# Patient Record
Sex: MALE | Race: WHITE | NOT HISPANIC OR LATINO | Employment: OTHER | URBAN - METROPOLITAN AREA
[De-identification: names, ages, dates, MRNs, and addresses within clinical notes are randomized per-mention and may not be internally consistent; named-entity substitution may affect disease eponyms.]

---

## 2018-01-04 ENCOUNTER — HOSPITAL ENCOUNTER (OUTPATIENT)
Dept: OTHER | Facility: HOSPITAL | Age: 83
Discharge: HOME | End: 2018-01-04
Attending: INTERNAL MEDICINE | Admitting: INTERNAL MEDICINE

## 2018-04-09 ENCOUNTER — HOSPITAL ENCOUNTER (OUTPATIENT)
Dept: GENERAL RADIOLOGY | Facility: HOSPITAL | Age: 83
Discharge: HOME | End: 2018-04-09
Attending: FAMILY MEDICINE | Admitting: FAMILY MEDICINE

## 2018-10-08 ENCOUNTER — HOSPITAL ENCOUNTER (OUTPATIENT)
Dept: OTHER | Facility: HOSPITAL | Age: 83
Discharge: HOME | End: 2018-10-08
Attending: FAMILY MEDICINE | Admitting: FAMILY MEDICINE

## 2018-11-28 ENCOUNTER — HOSPITAL ENCOUNTER (OUTPATIENT)
Dept: OTHER | Facility: HOSPITAL | Age: 83
Discharge: HOME | End: 2018-11-28
Attending: FAMILY MEDICINE | Admitting: FAMILY MEDICINE

## 2018-11-30 ENCOUNTER — HOSPITAL ENCOUNTER (OUTPATIENT)
Dept: OTHER | Facility: HOSPITAL | Age: 83
Discharge: HOME | End: 2018-11-30
Attending: FAMILY MEDICINE | Admitting: FAMILY MEDICINE

## 2019-07-31 ENCOUNTER — OFFICE VISIT (OUTPATIENT)
Dept: CARDIOLOGY | Facility: CLINIC | Age: 84
End: 2019-07-31

## 2019-07-31 VITALS
BODY MASS INDEX: 24.16 KG/M2 | DIASTOLIC BLOOD PRESSURE: 70 MMHG | SYSTOLIC BLOOD PRESSURE: 116 MMHG | WEIGHT: 145 LBS | HEART RATE: 79 BPM | OXYGEN SATURATION: 99 % | RESPIRATION RATE: 20 BRPM | HEIGHT: 65 IN

## 2019-07-31 DIAGNOSIS — F03.90 DEMENTIA WITHOUT BEHAVIORAL DISTURBANCE, UNSPECIFIED DEMENTIA TYPE: ICD-10-CM

## 2019-07-31 DIAGNOSIS — E78.2 MIXED HYPERLIPIDEMIA: ICD-10-CM

## 2019-07-31 DIAGNOSIS — N18.30 STAGE 3 CHRONIC KIDNEY DISEASE (HCC): ICD-10-CM

## 2019-07-31 DIAGNOSIS — I10 ESSENTIAL HYPERTENSION: ICD-10-CM

## 2019-07-31 DIAGNOSIS — G47.33 OSA (OBSTRUCTIVE SLEEP APNEA): ICD-10-CM

## 2019-07-31 DIAGNOSIS — I25.10 CORONARY ARTERY DISEASE DUE TO LIPID RICH PLAQUE: Primary | ICD-10-CM

## 2019-07-31 DIAGNOSIS — I25.83 CORONARY ARTERY DISEASE DUE TO LIPID RICH PLAQUE: Primary | ICD-10-CM

## 2019-07-31 PROCEDURE — 99204 OFFICE O/P NEW MOD 45 MIN: CPT | Performed by: INTERNAL MEDICINE

## 2019-07-31 RX ORDER — TAMSULOSIN HYDROCHLORIDE 0.4 MG/1
1 CAPSULE ORAL NIGHTLY
COMMUNITY

## 2019-07-31 RX ORDER — CHLORAL HYDRATE 500 MG
1 CAPSULE ORAL DAILY
COMMUNITY

## 2019-07-31 RX ORDER — ASPIRIN 81 MG/1
81 TABLET ORAL DAILY
COMMUNITY

## 2019-07-31 RX ORDER — ROPINIROLE 0.5 MG/1
0.5 TABLET, FILM COATED ORAL NIGHTLY
COMMUNITY

## 2019-08-01 PROBLEM — F03.90 DEMENTIA (HCC): Status: ACTIVE | Noted: 2019-08-01

## 2019-08-01 NOTE — PROGRESS NOTES
Subjective:     Encounter Date:07/31/2019      Patient ID: Matthew Rosenbaum is a 89 y.o. male.    Chief Complaint:  Chief Complaint   Patient presents with   • Hypertension   • Hyperlipidemia   • Coronary Artery Disease       HPI:  Matthew is a very pleasant 89-year-old patient with known coronary artery disease status post coronary artery bypass grafting in 1990 and then repeat coronary artery bypass grafting in 2014.  In 2014 he underwent cardiac catheterization was found to have an 80% lesion in his LAD and a 70% lesion in his circumflex artery with a totally occluded right coronary artery.    At the time he had a severely depressed LV systolic function and underwent ICD implantation.  1/4/2018 which showed an ejection fraction of 35 to 40%, mild to moderate global left ventricular hypokinesis with mild mitral regurgitation and sclerotic changes of the aortic valve.    He also has underlying dyslipidemia hypertension, obstructive sleep apnea and CKD class III.    Today he has no complaints from a cardiovascular standpoint.    The following portions of the patient's history were reviewed and updated as appropriate: allergies, current medications, past family history, past medical history, past social history, past surgical history and problem list.    Problem List:  Patient Active Problem List   Diagnosis   • CHICO (obstructive sleep apnea)   • Coronary artery disease due to lipid rich plaque   • Mixed hyperlipidemia   • Essential hypertension   • Stage 3 chronic kidney disease (CMS/HCC)   • Dementia       Past Medical History:  Past Medical History:   Diagnosis Date   • CAD (coronary artery disease)    • Chronic kidney disease    • Dementia 8/1/2019   • History of echocardiogram 01/04/2018    EF 35-40%, Mild-Mod golbal hypokinesis, Mild MR/TR, RVSP 38 mmHg, AV sclerotic and Mild calcification   • Hyperlipidemia    • Hypertension    • CHICO (obstructive sleep apnea)        Past Surgical History:  Past Surgical  "History:   Procedure Laterality Date   • CARDIAC CATHETERIZATION  05/2014    EF 50%, Mid LAD 80% stenosis, Circ 70% stenosis, % occluded   • CARDIAC DEFIBRILLATOR PLACEMENT  07/2014    [employer]   • CORONARY ARTERY BYPASS GRAFT         Social History:  Social History     Socioeconomic History   • Marital status:      Spouse name: Not on file   • Number of children: Not on file   • Years of education: Not on file   • Highest education level: Not on file   Tobacco Use   • Smoking status: Former Smoker   • Smokeless tobacco: Never Used   Substance and Sexual Activity   • Alcohol use: No     Frequency: Never   • Drug use: No       Allergies:  Allergies   Allergen Reactions   • Penicillins Hives         Review of Symptoms:  Constitutional: Patient afebrile no chills or unexpected weight changes  Respiratory: No cough, no wheezing or dyspnea  Cardiovascular: No chest pain, palpitations, dyspnea, orthopnea and no edema  Gastrointestinal: No nausea, vomiting, constipation or diarrhea.  No melena or dark stools    All other systems reviewed and are negative           Objective:         /70 (BP Location: Left arm, Patient Position: Sitting, Cuff Size: Large Adult)   Pulse 79   Resp 20   Ht 165.1 cm (65\")   Wt 65.8 kg (145 lb)   SpO2 99%   BMI 24.13 kg/m²     Physical exam  Constitutional: well-nourished, and appears stated age in no acute distress  PERRL: Conjunctiva clear, no pallor, anicteric  HENMT: normocephalic, normal dentition, no cyanosis or pallor  Neck:no bruits, or thrills and bilateral normal carotid upstroke. Normal jugular venous pressure  Cardiovascular: No parasternal heaves an non-displaced focal PMI. Normal rate and rhythm: no rub, +S3, murmur or click and normal S1 and S2; no lower or upper extremity edema.   Lungs: unlabored, no wheezing with no rales or rhonchi on auscultation.  Extremities: Warm, no clubbing, cyanosis, or edema. Full and equal peripheral pulses in extremities " with no bruits appreciated.   Abdomen: soft, non-tender, non-distended  Musculoskeletal: no joint tenderness or swelling and no erythema  Skin: Warm and dry, non-erythematous   Neuro:alert and normal affect. Oriented to time, place and person.       In-Office Procedure(s):  Procedures    ASCVD RIsk Score::  The ASCVD Risk score (Khari CORDOBA Jr., et al., 2013) failed to calculate for the following reasons:    The 2013 ASCVD risk score is only valid for ages 40 to 79    Recent Radiology:  Imaging Results (most recent)     None          Lab Review:   No visits with results within 2 Month(s) from this visit.   Latest known visit with results is:   Office Visit Converted on 04/08/2019   Component Date Value   • Albumin 10/08/2018 4.3    • Alkaline Phosphatase 10/08/2018 112    • Basophils Absolute 10/08/2018 51 CELLS/UL    • Basophil Rel % 10/08/2018 0.4    • Total Bilirubin 10/08/2018 0.7    • BUN 10/08/2018 32*   • BUN/Creatinine Ratio 10/08/2018 21 (calc)    • Calcium 10/08/2018 9.2    • Chloride 10/08/2018 102    • Chol/HDL Ratio 10/08/2018 3.0 (calc)    • Total Cholesterol 10/08/2018 134    • CO2 CONTENT  10/08/2018 25    • Creatinine 10/08/2018 1.49*   • eGFR  Am 10/08/2018 41*   • eGFR Non  Am 10/08/2018 48*   • Eosinophils Absolute 10/08/2018 356 CELLS/UL    • Eosinophil Rel % 10/08/2018 2.8    • Globulin 10/08/2018 3.7 G/DL (CALC)    • Glucose 10/08/2018 93    • Hematocrit 10/08/2018 39.2    • HDL Cholesterol 10/08/2018 45    • Hemoglobin 10/08/2018 13.2    • LDL Cholesterol  10/08/2018 69 MG/DL (CALC)    • Lymphocytes Absolute 10/08/2018 2502 CELLS/UL    • Lymphocyte Rel % 10/08/2018 19.7    • MCH 10/08/2018 31.1    • MCHC 10/08/2018 33.7 G/DL    • MCV 10/08/2018 92.2    • Monocyte Rel % 10/08/2018 8.3    • Monocytes Absolute 10/08/2018 1054 CELLS/UL*   • MPV 10/08/2018 9.9    • Neutrophils Absolute 10/08/2018 8738 CELLS/UL*   • Platelets 10/08/2018 288 THOUSAND/UL    • Neutrophils, Fluid 10/08/2018  68.8    • Potassium 10/08/2018 4.0    • Total Protein 10/08/2018 8.0    • RBC 10/08/2018 4.25 MILLION/UL    • RDW 10/08/2018 11.9    • AST (SGOT) 10/08/2018 16    • ALT (SGPT) 10/08/2018 8*   • Sodium 10/08/2018 140    • Triglycerides 10/08/2018 123    • WBC 10/08/2018 12.7 THOUSAND/UL*   • A/G Ratio 10/08/2018 1.2 (calc)    • Albumin 04/08/2019 4.7    • Alkaline Phosphatase 04/08/2019 99    • Basophils Absolute 04/08/2019 62 CELLS/UL    • Basophil Rel % 04/08/2019 0.6    • Total Bilirubin 04/08/2019 0.5    • BUN 04/08/2019 31*   • BUN/Creatinine Ratio 04/08/2019 19 (calc)    • Calcium 04/08/2019 9.2    • Chloride 04/08/2019 99    • Chol/HDL Ratio 04/08/2019 3.4 (calc)    • Total Cholesterol 04/08/2019 149    • CO2 CONTENT  04/08/2019 30    • Creatinine 04/08/2019 1.63*   • eGFR  Am 04/08/2019 37*   • eGFR Non African Am 04/08/2019 43*   • Eosinophils Absolute 04/08/2019 515 CELLS/UL*   • Eosinophil Rel % 04/08/2019 5.0    • Globulin 04/08/2019 3.4 G/DL (CALC)    • Glucose 04/08/2019 85    • Hematocrit 04/08/2019 39.8    • HDL Cholesterol 04/08/2019 44    • Hemoglobin 04/08/2019 13.6    • LDL Cholesterol  04/08/2019 79 MG/DL (CALC)    • Lymphocytes Absolute 04/08/2019 2379 CELLS/UL    • Lymphocyte Rel % 04/08/2019 23.1    • MCH 04/08/2019 31.6    • MCHC 04/08/2019 34.2 G/DL    • MCV 04/08/2019 92.3    • Monocyte Rel % 04/08/2019 7.4    • Monocytes Absolute 04/08/2019 762 CELLS/UL    • MPV 04/08/2019 10.4    • Neutrophils Absolute 04/08/2019 6582 CELLS/UL    • Platelets 04/08/2019 234 THOUSAND/UL    • Neutrophils, Fluid 04/08/2019 63.9    • Potassium 04/08/2019 3.9    • Total Protein 04/08/2019 8.1    • RBC 04/08/2019 4.31 MILLION/UL    • RDW 04/08/2019 12.3    • AST (SGOT) 04/08/2019 19    • ALT (SGPT) 04/08/2019 8*   • Sodium 04/08/2019 140    • Triglycerides 04/08/2019 164*   • WBC 04/08/2019 10.3 THOUSAND/UL    • A/G Ratio 04/08/2019 1.4 (calc)               Invalid input(s): ALKPO4                         Invalid input(s): LDLCALC                Assessment:          Diagnosis Plan   1. Coronary artery disease due to lipid rich plaque     2. Mixed hyperlipidemia     3. Essential hypertension     4. CHICO (obstructive sleep apnea)     5. Stage 3 chronic kidney disease (CMS/HCC)     6. Dementia without behavioral disturbance, unspecified dementia type            Plan:         1. Coronary artery disease due to lipid rich plaque  Currently clinically silent with ischemic cardiomyopathy status post implantable cardiac defibrillator.  New medical therapy as secondary prevention for the development of ischemic heart disease.    2. Mixed hyperlipidemia  Well-controlled on medical therapy    3. Essential hypertension  Well-controlled on medical therapy    4. CHICO (obstructive sleep apnea)  Using a CPAP    5. Stage 3 chronic kidney disease (CMS/HCC)  Stable    6. Dementia without behavioral disturbance, unspecified dementia type  Mild      Level of Care:                 Rajan Golden MD  08/01/19  .

## 2019-10-04 RX ORDER — PRAVASTATIN SODIUM 40 MG
TABLET ORAL
Qty: 90 TABLET | Refills: 1 | Status: SHIPPED | OUTPATIENT
Start: 2019-10-04 | End: 2020-03-11

## 2019-10-09 RX ORDER — POTASSIUM CHLORIDE 750 MG/1
TABLET, EXTENDED RELEASE ORAL
Qty: 90 TABLET | Refills: 1 | Status: SHIPPED | OUTPATIENT
Start: 2019-10-09 | End: 2020-03-11

## 2019-12-10 ENCOUNTER — TELEPHONE (OUTPATIENT)
Dept: CARDIOLOGY | Facility: CLINIC | Age: 84
End: 2019-12-10

## 2019-12-23 ENCOUNTER — OFFICE VISIT (OUTPATIENT)
Dept: CARDIOLOGY | Facility: CLINIC | Age: 84
End: 2019-12-23

## 2019-12-23 VITALS
BODY MASS INDEX: 23.37 KG/M2 | SYSTOLIC BLOOD PRESSURE: 129 MMHG | HEART RATE: 66 BPM | DIASTOLIC BLOOD PRESSURE: 64 MMHG | HEIGHT: 68 IN | WEIGHT: 154.2 LBS

## 2019-12-23 DIAGNOSIS — I10 ESSENTIAL HYPERTENSION: ICD-10-CM

## 2019-12-23 DIAGNOSIS — I25.83 CORONARY ARTERY DISEASE DUE TO LIPID RICH PLAQUE: Primary | ICD-10-CM

## 2019-12-23 DIAGNOSIS — I25.10 CORONARY ARTERY DISEASE DUE TO LIPID RICH PLAQUE: Primary | ICD-10-CM

## 2019-12-23 DIAGNOSIS — F03.90 DEMENTIA WITHOUT BEHAVIORAL DISTURBANCE, UNSPECIFIED DEMENTIA TYPE: ICD-10-CM

## 2019-12-23 DIAGNOSIS — N18.30 STAGE 3 CHRONIC KIDNEY DISEASE (HCC): ICD-10-CM

## 2019-12-23 DIAGNOSIS — Z95.810 ICD (IMPLANTABLE CARDIOVERTER-DEFIBRILLATOR), DUAL, IN SITU: ICD-10-CM

## 2019-12-23 PROCEDURE — 93289 INTERROG DEVICE EVAL HEART: CPT | Performed by: INTERNAL MEDICINE

## 2019-12-23 PROCEDURE — 99024 POSTOP FOLLOW-UP VISIT: CPT | Performed by: INTERNAL MEDICINE

## 2019-12-23 NOTE — PROGRESS NOTES
Subjective:     Encounter Date:12/23/2019      Patient ID: Matthew Rosenbaum is a 89 y.o. male.    Chief Complaint:  Presents for ICD followup    HPI:  Mr Rosenbaum is an 89 yo patient of Dr. Golden who presents for routine ICD followup.  He has a past medical history significant for HTN, HLD, CKD-3, CAD and ischemic cardiomyopathy.  He also has permanent AF.  He had an ICD implanted in 7/14.    Due to his dementia, he is not able to give any meaningful history of review of systems.      The following portions of the patient's history were reviewed and updated as appropriate: allergies, current medications, past family history, past medical history, past social history, past surgical history and problem list.    Problem List:  Patient Active Problem List   Diagnosis   • CHICO (obstructive sleep apnea)   • Coronary artery disease due to lipid rich plaque   • Mixed hyperlipidemia   • Essential hypertension   • Stage 3 chronic kidney disease (CMS/HCC)   • Dementia (CMS/[facility])   • ICD (implantable cardioverter-defibrillator), dual, in situ       Past Medical History:  Past Medical History:   Diagnosis Date   • CAD (coronary artery disease)    • Chronic kidney disease    • Dementia (CMS/[facility]) 8/1/2019   • History of echocardiogram 01/04/2018    EF 35-40%, Mild-Mod golbal hypokinesis, Mild MR/TR, RVSP 38 mmHg, AV sclerotic and Mild calcification   • Hyperlipidemia    • Hypertension    • CHICO (obstructive sleep apnea)        Past Surgical History:  Past Surgical History:   Procedure Laterality Date   • CARDIAC CATHETERIZATION  05/2014    EF 50%, Mid LAD 80% stenosis, Circ 70% stenosis, % occluded   • CARDIAC DEFIBRILLATOR PLACEMENT  07/2014    [employer]   • CORONARY ARTERY BYPASS GRAFT         Social History:  Social History     Socioeconomic History   • Marital status:      Spouse name: Not on file   • Number of children: Not on file   • Years of education: Not on file   • Highest education level: Not on file  "  Tobacco Use   • Smoking status: Former Smoker   • Smokeless tobacco: Never Used   Substance and Sexual Activity   • Alcohol use: No     Frequency: Never   • Drug use: No       Allergies:  Allergies   Allergen Reactions   • Penicillins Hives       Immunizations:    There is no immunization history on file for this patient.    ROS:  Review of Systems   Unable to perform ROS: dementia          Objective:         /64   Pulse 66   Ht 172.7 cm (68\")   Wt 69.9 kg (154 lb 3.2 oz)   BMI 23.45 kg/m²     Physical Exam   Constitutional: He is oriented to person, place, and time. He appears well-developed and well-nourished. No distress.   HENT:   Head: Normocephalic and atraumatic.   Eyes: Pupils are equal, round, and reactive to light. Conjunctivae and EOM are normal. No scleral icterus.   Neck: Normal range of motion. No thyromegaly present.   Cardiovascular: Normal rate, regular rhythm and normal heart sounds.   Pulmonary/Chest: Effort normal and breath sounds normal.   Abdominal: Soft. Bowel sounds are normal.   Musculoskeletal: Normal range of motion.   Neurological: He is alert and oriented to person, place, and time.   Skin: Skin is warm and dry.   Psychiatric: He has a normal mood and affect.       In-Office Procedure(s):  Procedures ICD eval interpreted by me  MDT [id number]  Battery 3.04V    R wave 1.3mv  Threshold 2.5V  Impedancd 523 ohms  HV 47/70 ohms    Events - 24 NSVT, no shocks    ASCVD RIsk Score::  The ASCVD Risk score ([place] Jr., et al., 2013) failed to calculate for the following reasons:    The 2013 ASCVD risk score is only valid for ages 40 to 79    Recent Radiology:  Imaging Results (Most Recent)     None          Lab Review:   No visits with results within 6 Month(s) from this visit.   Latest known visit with results is:   Office Visit Converted on 04/08/2019   Component Date Value   • Albumin 10/08/2018 4.3    • Alkaline Phosphatase 10/08/2018 112    • Basophils Absolute 10/08/2018 51 " CELLS/UL    • Basophil Rel % 10/08/2018 0.4    • Total Bilirubin 10/08/2018 0.7    • BUN 10/08/2018 32*   • BUN/Creatinine Ratio 10/08/2018 21 (calc)    • Calcium 10/08/2018 9.2    • Chloride 10/08/2018 102    • Chol/HDL Ratio 10/08/2018 3.0 (calc)    • Total Cholesterol 10/08/2018 134    • CO2 CONTENT  10/08/2018 25    • Creatinine 10/08/2018 1.49*   • eGFR  Am 10/08/2018 41*   • eGFR Non  Am 10/08/2018 48*   • Eosinophils Absolute 10/08/2018 356 CELLS/UL    • Eosinophil Rel % 10/08/2018 2.8    • Globulin 10/08/2018 3.7 G/DL (CALC)    • Glucose 10/08/2018 93    • Hematocrit 10/08/2018 39.2    • HDL Cholesterol 10/08/2018 45    • Hemoglobin 10/08/2018 13.2    • LDL Cholesterol  10/08/2018 69 MG/DL (CALC)    • Lymphocytes Absolute 10/08/2018 2502 CELLS/UL    • Lymphocyte Rel % 10/08/2018 19.7    • MCH 10/08/2018 31.1    • MCHC 10/08/2018 33.7 G/DL    • MCV 10/08/2018 92.2    • Monocyte Rel % 10/08/2018 8.3    • Monocytes Absolute 10/08/2018 1054 CELLS/UL*   • MPV 10/08/2018 9.9    • Neutrophils Absolute 10/08/2018 8738 CELLS/UL*   • Platelets 10/08/2018 288 THOUSAND/UL    • Neutrophils, Fluid 10/08/2018 68.8    • Potassium 10/08/2018 4.0    • Total Protein 10/08/2018 8.0    • RBC 10/08/2018 4.25 MILLION/UL    • RDW 10/08/2018 11.9    • AST (SGOT) 10/08/2018 16    • ALT (SGPT) 10/08/2018 8*   • Sodium 10/08/2018 140    • Triglycerides 10/08/2018 123    • WBC 10/08/2018 12.7 THOUSAND/UL*   • A/G Ratio 10/08/2018 1.2 (calc)    • Albumin 04/08/2019 4.7    • Alkaline Phosphatase 04/08/2019 99    • Basophils Absolute 04/08/2019 62 CELLS/UL    • Basophil Rel % 04/08/2019 0.6    • Total Bilirubin 04/08/2019 0.5    • BUN 04/08/2019 31*   • BUN/Creatinine Ratio 04/08/2019 19 (calc)    • Calcium 04/08/2019 9.2    • Chloride 04/08/2019 99    • Chol/HDL Ratio 04/08/2019 3.4 (calc)    • Total Cholesterol 04/08/2019 149    • CO2 CONTENT  04/08/2019 30    • Creatinine 04/08/2019 1.63*   • eGFR  Am 04/08/2019 37*    • eGFR Non African Am 04/08/2019 43*   • Eosinophils Absolute 04/08/2019 515 CELLS/UL*   • Eosinophil Rel % 04/08/2019 5.0    • Globulin 04/08/2019 3.4 G/DL (CALC)    • Glucose 04/08/2019 85    • Hematocrit 04/08/2019 39.8    • HDL Cholesterol 04/08/2019 44    • Hemoglobin 04/08/2019 13.6    • LDL Cholesterol  04/08/2019 79 MG/DL (CALC)    • Lymphocytes Absolute 04/08/2019 2379 CELLS/UL    • Lymphocyte Rel % 04/08/2019 23.1    • MCH 04/08/2019 31.6    • MCHC 04/08/2019 34.2 G/DL    • MCV 04/08/2019 92.3    • Monocyte Rel % 04/08/2019 7.4    • Monocytes Absolute 04/08/2019 762 CELLS/UL    • MPV 04/08/2019 10.4    • Neutrophils Absolute 04/08/2019 6582 CELLS/UL    • Platelets 04/08/2019 234 THOUSAND/UL    • Neutrophils, Fluid 04/08/2019 63.9    • Potassium 04/08/2019 3.9    • Total Protein 04/08/2019 8.1    • RBC 04/08/2019 4.31 MILLION/UL    • RDW 04/08/2019 12.3    • AST (SGOT) 04/08/2019 19    • ALT (SGPT) 04/08/2019 8*   • Sodium 04/08/2019 140    • Triglycerides 04/08/2019 164*   • WBC 04/08/2019 10.3 THOUSAND/UL    • A/G Ratio 04/08/2019 1.4 (calc)                 Assessment:          Diagnosis Plan   1. Coronary artery disease due to lipid rich plaque     2. Essential hypertension     3. ICD (implantable cardioverter-defibrillator), dual, in situ     4. Stage 3 chronic kidney disease (CMS/HCC)     5. Dementia without behavioral disturbance, unspecified dementia type (CMS/HCC)            Plan:      1. ICD followup - normal device function  2. NSVT - no shocks delivered  3. CAD - per Dr. Golden    RTC 6 months      Level of Care:                 Héctor Domínguez MD  12/23/19  .

## 2019-12-27 PROBLEM — Z95.810 ICD (IMPLANTABLE CARDIOVERTER-DEFIBRILLATOR), DUAL, IN SITU: Status: ACTIVE | Noted: 2019-12-27

## 2020-01-10 ENCOUNTER — OFFICE VISIT (OUTPATIENT)
Dept: CARDIOLOGY | Facility: CLINIC | Age: 85
End: 2020-01-10

## 2020-01-10 VITALS
WEIGHT: 151 LBS | SYSTOLIC BLOOD PRESSURE: 118 MMHG | HEIGHT: 68 IN | DIASTOLIC BLOOD PRESSURE: 72 MMHG | BODY MASS INDEX: 22.88 KG/M2 | OXYGEN SATURATION: 95 % | RESPIRATION RATE: 20 BRPM | HEART RATE: 75 BPM

## 2020-01-10 DIAGNOSIS — Z95.810 ICD (IMPLANTABLE CARDIOVERTER-DEFIBRILLATOR), DUAL, IN SITU: ICD-10-CM

## 2020-01-10 DIAGNOSIS — G47.33 OSA (OBSTRUCTIVE SLEEP APNEA): ICD-10-CM

## 2020-01-10 DIAGNOSIS — I25.10 CORONARY ARTERY DISEASE DUE TO LIPID RICH PLAQUE: ICD-10-CM

## 2020-01-10 DIAGNOSIS — R07.89 CHEST PAIN, ATYPICAL: ICD-10-CM

## 2020-01-10 DIAGNOSIS — E78.2 MIXED HYPERLIPIDEMIA: ICD-10-CM

## 2020-01-10 DIAGNOSIS — I10 ESSENTIAL HYPERTENSION: ICD-10-CM

## 2020-01-10 DIAGNOSIS — R07.9 CHEST PAIN DUE TO GERD: Primary | ICD-10-CM

## 2020-01-10 DIAGNOSIS — I25.83 CORONARY ARTERY DISEASE DUE TO LIPID RICH PLAQUE: ICD-10-CM

## 2020-01-10 DIAGNOSIS — K21.9 CHEST PAIN DUE TO GERD: Primary | ICD-10-CM

## 2020-01-10 PROCEDURE — 99214 OFFICE O/P EST MOD 30 MIN: CPT | Performed by: INTERNAL MEDICINE

## 2020-01-10 RX ORDER — OMEPRAZOLE 40 MG/1
40 CAPSULE, DELAYED RELEASE ORAL 2 TIMES DAILY
Qty: 180 CAPSULE | Refills: 3 | Status: SHIPPED | OUTPATIENT
Start: 2020-01-10 | End: 2020-12-29

## 2020-01-11 PROBLEM — R07.89 CHEST PAIN, ATYPICAL: Status: ACTIVE | Noted: 2020-01-11

## 2020-01-11 NOTE — PROGRESS NOTES
Subjective:     Encounter Date:01/10/2020      Patient ID: Matthew Rosenbaum is a 89 y.o. male.    Chief Complaint:  Chief Complaint   Patient presents with   • Coronary Artery Disease   • Hypertension   • Hyperlipidemia   • Chest Pain       HPI:  Matthew is a very pleasant 89-year-old patient with known coronary artery disease status post coronary artery bypass grafting in 1990 and then repeat coronary artery bypass grafting in 2014.  In 2014 he underwent cardiac catheterization was found to have an 80% lesion in his LAD and a 70% lesion in his circumflex artery with a totally occluded right coronary artery.    At the time he had a severely depressed LV systolic function and underwent ICD implantation.  1/4/2018 which showed an ejection fraction of 35 to 40%, mild to moderate global left ventricular hypokinesis with mild mitral regurgitation and sclerotic changes of the aortic valve.    He also has underlying dyslipidemia hypertension, obstructive sleep apnea and CKD class III.    He recently saw Dr. Domínguez for ICD interrogation.  Please see Dr. Domínguez's note.  Today he presents with one episode of chest pain which is a new complaint to me occurred in the middle of the night and woke him from sleep.  He has had other recurrences of this but only while seated some occurring shakila-prandially.  He has no worsening with exertion.  He has no associated diaphoresis shortness of breath.  Self-limiting lasting no more than 1 to 3 minutes.    The following portions of the patient's history were reviewed and updated as appropriate: He  has a past medical history of CAD (coronary artery disease), Chest pain, atypical (1/11/2020), Chronic kidney disease, Dementia (CMS/HCC) (8/1/2019), History of echocardiogram (01/04/2018), Hyperlipidemia, Hypertension, and CHICO (obstructive sleep apnea)..    Problem List:  Patient Active Problem List   Diagnosis   • CHICO (obstructive sleep apnea)   • Coronary artery disease due to lipid rich  plaque   • Mixed hyperlipidemia   • Essential hypertension   • Stage 3 chronic kidney disease (CMS/[facility])   • Dementia (CMS/[facility])   • ICD (implantable cardioverter-defibrillator), dual, in situ   • Chest pain, atypical       Past Medical History:  Past Medical History:   Diagnosis Date   • CAD (coronary artery disease)    • Chest pain, atypical 1/11/2020   • Chronic kidney disease    • Dementia (CMS/[facility]) 8/1/2019   • History of echocardiogram 01/04/2018    EF 35-40%, Mild-Mod golbal hypokinesis, Mild MR/TR, RVSP 38 mmHg, AV sclerotic and Mild calcification   • Hyperlipidemia    • Hypertension    • CHICO (obstructive sleep apnea)        Past Surgical History:  Past Surgical History:   Procedure Laterality Date   • CARDIAC CATHETERIZATION  05/2014    EF 50%, Mid LAD 80% stenosis, Circ 70% stenosis, % occluded   • CARDIAC DEFIBRILLATOR PLACEMENT  07/2014    [employer]   • CORONARY ARTERY BYPASS GRAFT         Social History:  Social History     Socioeconomic History   • Marital status:      Spouse name: Not on file   • Number of children: Not on file   • Years of education: Not on file   • Highest education level: Not on file   Tobacco Use   • Smoking status: Former Smoker   • Smokeless tobacco: Never Used   Substance and Sexual Activity   • Alcohol use: No     Frequency: Never   • Drug use: No   • Sexual activity: Defer       Allergies:  Allergies   Allergen Reactions   • Morphine Hives   • Penicillins Hives         Review of Symptoms:  Constitutional: Patient afebrile no chills or unexpected weight changes  Respiratory: No cough, no wheezing or dyspnea  Cardiovascular: No chest pain today, or palpitations, dyspnea, orthopnea and no edema  Gastrointestinal: No nausea, vomiting, constipation or diarrhea.  No melena or dark stools    All other systems reviewed and are negative             Objective:         /72 (BP Location: Left arm, Patient Position: Sitting, Cuff Size: Large Adult)   Pulse 75   Resp 20  "  Ht 172.7 cm (68\")   Wt 68.5 kg (151 lb)   SpO2 95%   BMI 22.96 kg/m²     Physical exam  Constitutional: well-nourished, and appears stated age in no acute distress  PERRL: Conjunctiva clear, no pallor, anicteric  HENMT: normocephalic, normal dentition, no cyanosis or pallor  Neck:no bruits, or thrills and bilateral normal carotid upstroke. Normal jugular venous pressure  Cardiovascular: No parasternal heaves an non-displaced focal PMI. Normal rate and rhythm: no rub, gallop, murmur or click and normal S1 and S2; no lower or upper extremity edema.   Lungs: unlabored, no wheezing with no rales or rhonchi on auscultation.  Extremities: Warm, no clubbing, cyanosis. Full and equal peripheral pulses in extremities with no bruits appreciated.   Abdomen: soft, non-tender, non-distended  Musculoskeletal: no joint tenderness or swelling and no erythema  Skin: Warm and dry, non-erythematous   Neuro:alert and normal affect. Oriented to time, place and person.       In-Office Procedure(s):  Procedures    ASCVD RIsk Score::  The ASCVD Risk score (Khari DC Jr., et al., 2013) failed to calculate for the following reasons:    The 2013 ASCVD risk score is only valid for ages 40 to 79    Recent Radiology:  Imaging Results (Most Recent)     None          Lab Review:   No visits with results within 2 Month(s) from this visit.   Latest known visit with results is:   Office Visit Converted on 04/08/2019   Component Date Value   • Albumin 10/08/2018 4.3    • Alkaline Phosphatase 10/08/2018 112    • Basophils Absolute 10/08/2018 51 CELLS/UL    • Basophil Rel % 10/08/2018 0.4    • Total Bilirubin 10/08/2018 0.7    • BUN 10/08/2018 32*   • BUN/Creatinine Ratio 10/08/2018 21 (calc)    • Calcium 10/08/2018 9.2    • Chloride 10/08/2018 102    • Chol/HDL Ratio 10/08/2018 3.0 (calc)    • Total Cholesterol 10/08/2018 134    • CO2 CONTENT  10/08/2018 25    • Creatinine 10/08/2018 1.49*   • eGFR  Am 10/08/2018 41*   • eGFR Non  Am " 10/08/2018 48*   • Eosinophils Absolute 10/08/2018 356 CELLS/UL    • Eosinophil Rel % 10/08/2018 2.8    • Globulin 10/08/2018 3.7 G/DL (CALC)    • Glucose 10/08/2018 93    • Hematocrit 10/08/2018 39.2    • HDL Cholesterol 10/08/2018 45    • Hemoglobin 10/08/2018 13.2    • LDL Cholesterol  10/08/2018 69 MG/DL (CALC)    • Lymphocytes Absolute 10/08/2018 2502 CELLS/UL    • Lymphocyte Rel % 10/08/2018 19.7    • MCH 10/08/2018 31.1    • MCHC 10/08/2018 33.7 G/DL    • MCV 10/08/2018 92.2    • Monocyte Rel % 10/08/2018 8.3    • Monocytes Absolute 10/08/2018 1054 CELLS/UL*   • MPV 10/08/2018 9.9    • Neutrophils Absolute 10/08/2018 8738 CELLS/UL*   • Platelets 10/08/2018 288 THOUSAND/UL    • Neutrophils, Fluid 10/08/2018 68.8    • Potassium 10/08/2018 4.0    • Total Protein 10/08/2018 8.0    • RBC 10/08/2018 4.25 MILLION/UL    • RDW 10/08/2018 11.9    • AST (SGOT) 10/08/2018 16    • ALT (SGPT) 10/08/2018 8*   • Sodium 10/08/2018 140    • Triglycerides 10/08/2018 123    • WBC 10/08/2018 12.7 THOUSAND/UL*   • A/G Ratio 10/08/2018 1.2 (calc)    • Albumin 04/08/2019 4.7    • Alkaline Phosphatase 04/08/2019 99    • Basophils Absolute 04/08/2019 62 CELLS/UL    • Basophil Rel % 04/08/2019 0.6    • Total Bilirubin 04/08/2019 0.5    • BUN 04/08/2019 31*   • BUN/Creatinine Ratio 04/08/2019 19 (calc)    • Calcium 04/08/2019 9.2    • Chloride 04/08/2019 99    • Chol/HDL Ratio 04/08/2019 3.4 (calc)    • Total Cholesterol 04/08/2019 149    • CO2 CONTENT  04/08/2019 30    • Creatinine 04/08/2019 1.63*   • eGFR  Am 04/08/2019 37*   • eGFR Non African Am 04/08/2019 43*   • Eosinophils Absolute 04/08/2019 515 CELLS/UL*   • Eosinophil Rel % 04/08/2019 5.0    • Globulin 04/08/2019 3.4 G/DL (CALC)    • Glucose 04/08/2019 85    • Hematocrit 04/08/2019 39.8    • HDL Cholesterol 04/08/2019 44    • Hemoglobin 04/08/2019 13.6    • LDL Cholesterol  04/08/2019 79 MG/DL (CALC)    • Lymphocytes Absolute 04/08/2019 2379 CELLS/UL    • Lymphocyte  Rel % 04/08/2019 23.1    • MCH 04/08/2019 31.6    • MCHC 04/08/2019 34.2 G/DL    • MCV 04/08/2019 92.3    • Monocyte Rel % 04/08/2019 7.4    • Monocytes Absolute 04/08/2019 762 CELLS/UL    • MPV 04/08/2019 10.4    • Neutrophils Absolute 04/08/2019 6582 CELLS/UL    • Platelets 04/08/2019 234 THOUSAND/UL    • Neutrophils, Fluid 04/08/2019 63.9    • Potassium 04/08/2019 3.9    • Total Protein 04/08/2019 8.1    • RBC 04/08/2019 4.31 MILLION/UL    • RDW 04/08/2019 12.3    • AST (SGOT) 04/08/2019 19    • ALT (SGPT) 04/08/2019 8*   • Sodium 04/08/2019 140    • Triglycerides 04/08/2019 164*   • WBC 04/08/2019 10.3 THOUSAND/UL    • A/G Ratio 04/08/2019 1.4 (calc)               Invalid input(s): ALKPO4                        Invalid input(s): LDLCALC                Assessment:          Diagnosis Plan   1. Chest pain due to GERD  omeprazole (priLOSEC) 40 MG capsule   2. Coronary artery disease due to lipid rich plaque     3. Essential hypertension     4. ICD (implantable cardioverter-defibrillator), dual, in situ     5. Mixed hyperlipidemia     6. CHICO (obstructive sleep apnea)     7. Chest pain, atypical            Plan:         1. Chest pain due to GERD  I feel that his chest pain is very atypical and likely associated with underlying gastroesophageal reflux disease.  - omeprazole (priLOSEC) 40 MG capsule; Take 1 capsule by mouth 2 (Two) Times a Day.  Dispense: 180 capsule; Refill: 3    2. Coronary artery disease due to lipid rich plaque  Minimal concern for this being clinically active.  Continue medical therapy secondary prevention for the development of ischemic heart disease.    3. Essential hypertension  Well-controlled on medical therapy    4. ICD (implantable cardioverter-defibrillator), dual, in situ  Recently interrogated and no events reported.    5. Mixed hyperlipidemia  Well-controlled on medical therapy    6. CHICO (obstructive sleep apnea)  Continue CPAP    7. Chest pain, atypical  Likely GERD.      Level of  Care:                 Rajan Golden MD  01/11/20  .

## 2020-03-11 RX ORDER — POTASSIUM CHLORIDE 750 MG/1
TABLET, EXTENDED RELEASE ORAL
Qty: 90 TABLET | Refills: 1 | Status: SHIPPED | OUTPATIENT
Start: 2020-03-11

## 2020-03-11 RX ORDER — PRAVASTATIN SODIUM 40 MG
TABLET ORAL
Qty: 90 TABLET | Refills: 1 | Status: SHIPPED | OUTPATIENT
Start: 2020-03-11 | End: 2020-07-14

## 2020-06-22 ENCOUNTER — OFFICE VISIT (OUTPATIENT)
Dept: CARDIOLOGY | Facility: CLINIC | Age: 85
End: 2020-06-22

## 2020-06-22 VITALS
WEIGHT: 149 LBS | HEART RATE: 60 BPM | BODY MASS INDEX: 22.58 KG/M2 | SYSTOLIC BLOOD PRESSURE: 132 MMHG | RESPIRATION RATE: 16 BRPM | HEIGHT: 68 IN | DIASTOLIC BLOOD PRESSURE: 74 MMHG

## 2020-06-22 DIAGNOSIS — F03.90 DEMENTIA WITHOUT BEHAVIORAL DISTURBANCE, UNSPECIFIED DEMENTIA TYPE: ICD-10-CM

## 2020-06-22 DIAGNOSIS — Z95.810 ICD (IMPLANTABLE CARDIOVERTER-DEFIBRILLATOR), DUAL, IN SITU: ICD-10-CM

## 2020-06-22 DIAGNOSIS — I25.10 CORONARY ARTERY DISEASE DUE TO LIPID RICH PLAQUE: Primary | ICD-10-CM

## 2020-06-22 DIAGNOSIS — I25.83 CORONARY ARTERY DISEASE DUE TO LIPID RICH PLAQUE: Primary | ICD-10-CM

## 2020-06-22 PROCEDURE — 99212 OFFICE O/P EST SF 10 MIN: CPT | Performed by: INTERNAL MEDICINE

## 2020-06-22 PROCEDURE — 93289 INTERROG DEVICE EVAL HEART: CPT | Performed by: INTERNAL MEDICINE

## 2020-06-22 NOTE — PROGRESS NOTES
Subjective:     Encounter Date:06/22/2020      Patient ID: Matthew Rosenbaum is a 89 y.o. male.    Chief Complaint:  ICD followup    HPI:  Mr Rosenbaum is an 89 yo patient of Dr. Golden who presents for routine ICD followup.  He has a past medical history significant for HTN, HLD, CKD-3, CAD and ischemic cardiomyopathy.  He also has permanent AF.  He had an ICD implanted in 7/14.     Due to his dementia, he is not able to give any meaningful history of review of systems.      The following portions of the patient's history were reviewed and updated as appropriate: allergies, current medications, past family history, past medical history, past social history, past surgical history and problem list.    Problem List:  Patient Active Problem List   Diagnosis   • CHICO (obstructive sleep apnea)   • Coronary artery disease due to lipid rich plaque   • Mixed hyperlipidemia   • Essential hypertension   • Stage 3 chronic kidney disease (CMS/HCC)   • Dementia (CMS/[facility])   • ICD (implantable cardioverter-defibrillator), dual, in situ   • Chest pain, atypical       Past Medical History:  Past Medical History:   Diagnosis Date   • CAD (coronary artery disease)    • Chest pain, atypical 1/11/2020   • Chronic kidney disease    • Dementia (CMS/HCC) 8/1/2019   • History of echocardiogram 01/04/2018    EF 35-40%, Mild-Mod golbal hypokinesis, Mild MR/TR, RVSP 38 mmHg, AV sclerotic and Mild calcification   • Hyperlipidemia    • Hypertension    • CHICO (obstructive sleep apnea)        Past Surgical History:  Past Surgical History:   Procedure Laterality Date   • CARDIAC CATHETERIZATION  05/2014    EF 50%, Mid LAD 80% stenosis, Circ 70% stenosis, % occluded   • CARDIAC DEFIBRILLATOR PLACEMENT  07/2014    [employer]   • CORONARY ARTERY BYPASS GRAFT         Social History:  Social History     Socioeconomic History   • Marital status:      Spouse name: Not on file   • Number of children: Not on file   • Years of education: Not on  "file   • Highest education level: Not on file   Tobacco Use   • Smoking status: Former Smoker   • Smokeless tobacco: Never Used   Substance and Sexual Activity   • Alcohol use: No     Frequency: Never   • Drug use: No   • Sexual activity: Defer       Allergies:  Allergies   Allergen Reactions   • Morphine Hives   • Penicillins Hives       Immunizations:    There is no immunization history on file for this patient.    ROS:  Review of Systems   Unable to perform ROS: dementia          Objective:         /74   Pulse 60   Resp 16   Ht 172.7 cm (68\")   Wt 67.6 kg (149 lb)   BMI 22.66 kg/m²     Physical Exam   Constitutional: He is oriented to person, place, and time. He appears well-developed and well-nourished. No distress.   HENT:   Head: Normocephalic and atraumatic.   Eyes: Pupils are equal, round, and reactive to light. Conjunctivae and EOM are normal. No scleral icterus.   Neck: Normal range of motion. No thyromegaly present.   Cardiovascular: Normal rate, regular rhythm and normal heart sounds.   Pulmonary/Chest: Effort normal and breath sounds normal.   Abdominal: Soft. Bowel sounds are normal.   Musculoskeletal: Normal range of motion.   Neurological: He is alert and oriented to person, place, and time.   Skin: Skin is warm and dry.   Psychiatric: He has a normal mood and affect.       In-Office Procedure(s):    Procedures ICD eval interpreted by me  MDT [id number]  Battery 3.01V     R wave 1.1mv  Threshold 2.75V  Impedancd 532 ohms  HV 44/61 ohms     Events - no VT, no shocks    ASCVD RIsk Score::  The ASCVD Risk score (Khari BERYL Jr., et al., 2013) failed to calculate for the following reasons:    The 2013 ASCVD risk score is only valid for ages 40 to 79    Recent Radiology:  Imaging Results (Most Recent)     None          Lab Review:   No visits with results within 2 Month(s) from this visit.   Latest known visit with results is:   Office Visit Converted on 04/08/2019   Component Date Value   • Albumin " 10/08/2018 4.3    • Alkaline Phosphatase 10/08/2018 112    • Basophils Absolute 10/08/2018 51 CELLS/UL    • Basophil Rel % 10/08/2018 0.4    • Total Bilirubin 10/08/2018 0.7    • BUN 10/08/2018 32*   • BUN/Creatinine Ratio 10/08/2018 21 (calc)    • Calcium 10/08/2018 9.2    • Chloride 10/08/2018 102    • Chol/HDL Ratio 10/08/2018 3.0 (calc)    • Total Cholesterol 10/08/2018 134    • CO2 CONTENT  10/08/2018 25    • Creatinine 10/08/2018 1.49*   • eGFR  Am 10/08/2018 41*   • eGFR Non  Am 10/08/2018 48*   • Eosinophils Absolute 10/08/2018 356 CELLS/UL    • Eosinophil Rel % 10/08/2018 2.8    • Globulin 10/08/2018 3.7 G/DL (CALC)    • Glucose 10/08/2018 93    • Hematocrit 10/08/2018 39.2    • HDL Cholesterol 10/08/2018 45    • Hemoglobin 10/08/2018 13.2    • LDL Cholesterol  10/08/2018 69 MG/DL (CALC)    • Lymphocytes Absolute 10/08/2018 2502 CELLS/UL    • Lymphocyte Rel % 10/08/2018 19.7    • MCH 10/08/2018 31.1    • MCHC 10/08/2018 33.7 G/DL    • MCV 10/08/2018 92.2    • Monocyte Rel % 10/08/2018 8.3    • Monocytes Absolute 10/08/2018 1054 CELLS/UL*   • MPV 10/08/2018 9.9    • Neutrophils Absolute 10/08/2018 8738 CELLS/UL*   • Platelets 10/08/2018 288 THOUSAND/UL    • Neutrophils, Fluid 10/08/2018 68.8    • Potassium 10/08/2018 4.0    • Total Protein 10/08/2018 8.0    • RBC 10/08/2018 4.25 MILLION/UL    • RDW 10/08/2018 11.9    • AST (SGOT) 10/08/2018 16    • ALT (SGPT) 10/08/2018 8*   • Sodium 10/08/2018 140    • Triglycerides 10/08/2018 123    • WBC 10/08/2018 12.7 THOUSAND/UL*   • A/G Ratio 10/08/2018 1.2 (calc)    • Albumin 04/08/2019 4.7    • Alkaline Phosphatase 04/08/2019 99    • Basophils Absolute 04/08/2019 62 CELLS/UL    • Basophil Rel % 04/08/2019 0.6    • Total Bilirubin 04/08/2019 0.5    • BUN 04/08/2019 31*   • BUN/Creatinine Ratio 04/08/2019 19 (calc)    • Calcium 04/08/2019 9.2    • Chloride 04/08/2019 99    • Chol/HDL Ratio 04/08/2019 3.4 (calc)    • Total Cholesterol 04/08/2019 149    •  CO2 CONTENT  04/08/2019 30    • Creatinine 04/08/2019 1.63*   • eGFR  Am 04/08/2019 37*   • eGFR Non African Am 04/08/2019 43*   • Eosinophils Absolute 04/08/2019 515 CELLS/UL*   • Eosinophil Rel % 04/08/2019 5.0    • Globulin 04/08/2019 3.4 G/DL (CALC)    • Glucose 04/08/2019 85    • Hematocrit 04/08/2019 39.8    • HDL Cholesterol 04/08/2019 44    • Hemoglobin 04/08/2019 13.6    • LDL Cholesterol  04/08/2019 79 MG/DL (CALC)    • Lymphocytes Absolute 04/08/2019 2379 CELLS/UL    • Lymphocyte Rel % 04/08/2019 23.1    • MCH 04/08/2019 31.6    • MCHC 04/08/2019 34.2 G/DL    • MCV 04/08/2019 92.3    • Monocyte Rel % 04/08/2019 7.4    • Monocytes Absolute 04/08/2019 762 CELLS/UL    • MPV 04/08/2019 10.4    • Neutrophils Absolute 04/08/2019 6582 CELLS/UL    • Platelets 04/08/2019 234 THOUSAND/UL    • Neutrophils, Fluid 04/08/2019 63.9    • Potassium 04/08/2019 3.9    • Total Protein 04/08/2019 8.1    • RBC 04/08/2019 4.31 MILLION/UL    • RDW 04/08/2019 12.3    • AST (SGOT) 04/08/2019 19    • ALT (SGPT) 04/08/2019 8*   • Sodium 04/08/2019 140    • Triglycerides 04/08/2019 164*   • WBC 04/08/2019 10.3 THOUSAND/UL    • A/G Ratio 04/08/2019 1.4 (calc)                 Assessment:          Diagnosis Plan   1. Coronary artery disease due to lipid rich plaque     2. ICD (implantable cardioverter-defibrillator), dual, in situ     3. Dementia without behavioral disturbance, unspecified dementia type (CMS/[facility])            Plan:      1. CAD with ICM - appears euvolemic, wife reports no angina, on ASA, statin, B blocker, Lasix  2. ICD Followup - normal device function  3. VT - no VT    Continue same  RTC 6 months      Level of Care:                 Héctor Domínguez MD  06/22/20  .

## 2020-07-10 ENCOUNTER — RESULTS ENCOUNTER (OUTPATIENT)
Dept: CARDIOLOGY | Facility: CLINIC | Age: 85
End: 2020-07-10

## 2020-07-10 ENCOUNTER — OFFICE VISIT (OUTPATIENT)
Dept: CARDIOLOGY | Facility: CLINIC | Age: 85
End: 2020-07-10

## 2020-07-10 VITALS
BODY MASS INDEX: 22.46 KG/M2 | HEIGHT: 68 IN | RESPIRATION RATE: 20 BRPM | DIASTOLIC BLOOD PRESSURE: 70 MMHG | HEART RATE: 57 BPM | WEIGHT: 148.2 LBS | SYSTOLIC BLOOD PRESSURE: 114 MMHG | OXYGEN SATURATION: 95 %

## 2020-07-10 DIAGNOSIS — G47.33 OSA (OBSTRUCTIVE SLEEP APNEA): ICD-10-CM

## 2020-07-10 DIAGNOSIS — Z01.818 PRE-OP TESTING: ICD-10-CM

## 2020-07-10 DIAGNOSIS — R06.09 DYSPNEA ON EXERTION: ICD-10-CM

## 2020-07-10 DIAGNOSIS — I10 ESSENTIAL HYPERTENSION: ICD-10-CM

## 2020-07-10 DIAGNOSIS — E78.2 MIXED HYPERLIPIDEMIA: ICD-10-CM

## 2020-07-10 DIAGNOSIS — I25.83 CORONARY ARTERY DISEASE DUE TO LIPID RICH PLAQUE: Primary | ICD-10-CM

## 2020-07-10 DIAGNOSIS — Z01.818 ENCOUNTER FOR PREADMISSION TESTING: Primary | ICD-10-CM

## 2020-07-10 DIAGNOSIS — I25.10 CORONARY ARTERY DISEASE DUE TO LIPID RICH PLAQUE: Primary | ICD-10-CM

## 2020-07-10 DIAGNOSIS — Z01.818 ENCOUNTER FOR PREADMISSION TESTING: ICD-10-CM

## 2020-07-10 DIAGNOSIS — Z95.810 ICD (IMPLANTABLE CARDIOVERTER-DEFIBRILLATOR), DUAL, IN SITU: ICD-10-CM

## 2020-07-10 DIAGNOSIS — I20.8 ATYPICAL ANGINA (HCC): ICD-10-CM

## 2020-07-10 PROCEDURE — 99215 OFFICE O/P EST HI 40 MIN: CPT | Performed by: INTERNAL MEDICINE

## 2020-07-14 ENCOUNTER — LAB (OUTPATIENT)
Dept: LAB | Facility: HOSPITAL | Age: 85
End: 2020-07-14
Attending: INTERNAL MEDICINE

## 2020-07-14 DIAGNOSIS — Z01.818 PRE-OP TESTING: ICD-10-CM

## 2020-07-14 DIAGNOSIS — I25.83 CORONARY ARTERY DISEASE DUE TO LIPID RICH PLAQUE: ICD-10-CM

## 2020-07-14 DIAGNOSIS — I25.10 CORONARY ARTERY DISEASE DUE TO LIPID RICH PLAQUE: ICD-10-CM

## 2020-07-14 DIAGNOSIS — Z01.810 PREOP CARDIOVASCULAR EXAM: Primary | ICD-10-CM

## 2020-07-14 LAB
ALBUMIN SERPL-MCNC: 4.2 G/DL (ref 3.5–5.2)
ALBUMIN/GLOB SERPL: 1.2 G/DL
ALP SERPL-CCNC: 108 U/L (ref 39–117)
ALT SERPL W P-5'-P-CCNC: 8 U/L (ref 1–41)
ANION GAP SERPL CALCULATED.3IONS-SCNC: 11.1 MMOL/L (ref 5–15)
AST SERPL-CCNC: 18 U/L (ref 1–40)
BASOPHILS # BLD AUTO: 0.06 10*3/MM3 (ref 0–0.2)
BASOPHILS NFR BLD AUTO: 0.5 % (ref 0–1.5)
BILIRUB SERPL-MCNC: 0.3 MG/DL (ref 0–1.2)
BUN SERPL-MCNC: 29 MG/DL (ref 8–23)
BUN/CREAT SERPL: 17.3 (ref 7–25)
CALCIUM SPEC-SCNC: 9.2 MG/DL (ref 8.6–10.5)
CHLORIDE SERPL-SCNC: 101 MMOL/L (ref 98–107)
CO2 SERPL-SCNC: 26.9 MMOL/L (ref 22–29)
CREAT SERPL-MCNC: 1.68 MG/DL (ref 0.76–1.27)
DEPRECATED RDW RBC AUTO: 38.5 FL (ref 37–54)
EOSINOPHIL # BLD AUTO: 0.71 10*3/MM3 (ref 0–0.4)
EOSINOPHIL NFR BLD AUTO: 6.3 % (ref 0.3–6.2)
ERYTHROCYTE [DISTWIDTH] IN BLOOD BY AUTOMATED COUNT: 11.9 % (ref 12.3–15.4)
GFR SERPL CREATININE-BSD FRML MDRD: 39 ML/MIN/1.73
GLOBULIN UR ELPH-MCNC: 3.6 GM/DL
GLUCOSE SERPL-MCNC: 94 MG/DL (ref 65–99)
HCT VFR BLD AUTO: 37.8 % (ref 37.5–51)
HGB BLD-MCNC: 13 G/DL (ref 13–17.7)
IMM GRANULOCYTES # BLD AUTO: 0.04 10*3/MM3 (ref 0–0.05)
IMM GRANULOCYTES NFR BLD AUTO: 0.4 % (ref 0–0.5)
INR PPP: 1.01 (ref 0.9–1.1)
LYMPHOCYTES # BLD AUTO: 3.21 10*3/MM3 (ref 0.7–3.1)
LYMPHOCYTES NFR BLD AUTO: 28.4 % (ref 19.6–45.3)
MCH RBC QN AUTO: 30.7 PG (ref 26.6–33)
MCHC RBC AUTO-ENTMCNC: 34.4 G/DL (ref 31.5–35.7)
MCV RBC AUTO: 89.4 FL (ref 79–97)
MONOCYTES # BLD AUTO: 0.86 10*3/MM3 (ref 0.1–0.9)
MONOCYTES NFR BLD AUTO: 7.6 % (ref 5–12)
NEUTROPHILS NFR BLD AUTO: 56.8 % (ref 42.7–76)
NEUTROPHILS NFR BLD AUTO: 6.43 10*3/MM3 (ref 1.7–7)
NRBC BLD AUTO-RTO: 0 /100 WBC (ref 0–0.2)
PLATELET # BLD AUTO: 238 10*3/MM3 (ref 140–450)
PMV BLD AUTO: 10.2 FL (ref 6–12)
POTASSIUM SERPL-SCNC: 5 MMOL/L (ref 3.5–5.2)
PROT SERPL-MCNC: 7.8 G/DL (ref 6–8.5)
PROTHROMBIN TIME: 10.6 SECONDS (ref 9.6–11.7)
RBC # BLD AUTO: 4.23 10*6/MM3 (ref 4.14–5.8)
SODIUM SERPL-SCNC: 139 MMOL/L (ref 136–145)
WBC # BLD AUTO: 11.31 10*3/MM3 (ref 3.4–10.8)

## 2020-07-14 PROCEDURE — 36415 COLL VENOUS BLD VENIPUNCTURE: CPT

## 2020-07-14 PROCEDURE — U0002 COVID-19 LAB TEST NON-CDC: HCPCS

## 2020-07-14 PROCEDURE — 85610 PROTHROMBIN TIME: CPT

## 2020-07-14 PROCEDURE — 85025 COMPLETE CBC W/AUTO DIFF WBC: CPT

## 2020-07-14 PROCEDURE — U0004 COV-19 TEST NON-CDC HGH THRU: HCPCS

## 2020-07-14 PROCEDURE — C9803 HOPD COVID-19 SPEC COLLECT: HCPCS

## 2020-07-14 PROCEDURE — 80053 COMPREHEN METABOLIC PANEL: CPT

## 2020-07-14 RX ORDER — PRAVASTATIN SODIUM 40 MG
40 TABLET ORAL DAILY
COMMUNITY

## 2020-07-15 LAB
REF LAB TEST METHOD: NORMAL
SARS-COV-2 RNA RESP QL NAA+PROBE: NOT DETECTED

## 2020-07-15 RX ORDER — SODIUM CHLORIDE 9 MG/ML
100 INJECTION, SOLUTION INTRAVENOUS CONTINUOUS
Status: DISCONTINUED | OUTPATIENT
Start: 2020-07-16 | End: 2020-07-16 | Stop reason: HOSPADM

## 2020-07-16 ENCOUNTER — HOSPITAL ENCOUNTER (OUTPATIENT)
Facility: HOSPITAL | Age: 85
Setting detail: HOSPITAL OUTPATIENT SURGERY
Discharge: HOME | End: 2020-07-16
Attending: INTERNAL MEDICINE | Admitting: INTERNAL MEDICINE

## 2020-07-16 VITALS
BODY MASS INDEX: 25.1 KG/M2 | HEART RATE: 76 BPM | TEMPERATURE: 97 F | SYSTOLIC BLOOD PRESSURE: 126 MMHG | RESPIRATION RATE: 17 BRPM | WEIGHT: 147.05 LBS | OXYGEN SATURATION: 96 % | DIASTOLIC BLOOD PRESSURE: 68 MMHG | HEIGHT: 64 IN

## 2020-07-16 DIAGNOSIS — I25.83 CORONARY ARTERY DISEASE DUE TO LIPID RICH PLAQUE: ICD-10-CM

## 2020-07-16 DIAGNOSIS — R06.09 DYSPNEA ON EXERTION: ICD-10-CM

## 2020-07-16 DIAGNOSIS — I25.10 CORONARY ARTERY DISEASE DUE TO LIPID RICH PLAQUE: ICD-10-CM

## 2020-07-16 DIAGNOSIS — I20.8 ATYPICAL ANGINA (HCC): ICD-10-CM

## 2020-07-16 LAB
ARTERIAL PATENCY WRIST A: ABNORMAL
ARTERIAL PATENCY WRIST A: ABNORMAL
ATMOSPHERIC PRESS: ABNORMAL MM[HG]
ATMOSPHERIC PRESS: ABNORMAL MM[HG]
BASE EXCESS BLDA CALC-SCNC: 0 MMOL/L (ref 0–3)
BASE EXCESS BLDA CALC-SCNC: 0.8 MMOL/L (ref 0–3)
BDY SITE: ABNORMAL
CO2 BLDA-SCNC: 26.5 MMOL/L (ref 22–29)
CO2 BLDA-SCNC: 27.8 MMOL/L (ref 22–29)
HCO3 BLDA-SCNC: 25.2 MMOL/L (ref 21–28)
HCO3 BLDA-SCNC: 26.4 MMOL/L (ref 21–28)
HEMODILUTION: NO
HEMODILUTION: NO
INHALED O2 CONCENTRATION: 21 %
MODALITY: ABNORMAL
MODALITY: ABNORMAL
PCO2 BLDA: 42.5 MM HG (ref 35–48)
PCO2 BLDA: 45.4 MM HG (ref 35–48)
PH BLDA: 7.37 PH UNITS (ref 7.35–7.45)
PH BLDA: 7.38 PH UNITS (ref 7.35–7.45)
PO2 BLDA: 37.2 MM HG (ref 83–108)
PO2 BLDA: 58.7 MM HG (ref 83–108)
SAO2 % BLDCOA: 68.9 % (ref 94–98)
SAO2 % BLDCOA: 89.4 % (ref 94–98)

## 2020-07-16 PROCEDURE — 82803 BLOOD GASES ANY COMBINATION: CPT

## 2020-07-16 PROCEDURE — 36600 WITHDRAWAL OF ARTERIAL BLOOD: CPT

## 2020-07-16 PROCEDURE — 99153 MOD SED SAME PHYS/QHP EA: CPT | Performed by: INTERNAL MEDICINE

## 2020-07-16 PROCEDURE — 0 IOPAMIDOL PER 1 ML: Performed by: INTERNAL MEDICINE

## 2020-07-16 PROCEDURE — 93461 R&L HRT ART/VENTRICLE ANGIO: CPT | Performed by: INTERNAL MEDICINE

## 2020-07-16 PROCEDURE — 25010000002 FENTANYL CITRATE (PF) 100 MCG/2ML SOLUTION: Performed by: INTERNAL MEDICINE

## 2020-07-16 PROCEDURE — 25010000002 MIDAZOLAM PER 1 MG: Performed by: INTERNAL MEDICINE

## 2020-07-16 PROCEDURE — C1894 INTRO/SHEATH, NON-LASER: HCPCS | Performed by: INTERNAL MEDICINE

## 2020-07-16 PROCEDURE — 99152 MOD SED SAME PHYS/QHP 5/>YRS: CPT | Performed by: INTERNAL MEDICINE

## 2020-07-16 PROCEDURE — C1769 GUIDE WIRE: HCPCS | Performed by: INTERNAL MEDICINE

## 2020-07-16 RX ORDER — SODIUM CHLORIDE 9 MG/ML
250 INJECTION, SOLUTION INTRAVENOUS ONCE AS NEEDED
Status: DISCONTINUED | OUTPATIENT
Start: 2020-07-16 | End: 2020-07-16 | Stop reason: HOSPADM

## 2020-07-16 RX ORDER — FENTANYL CITRATE 50 UG/ML
INJECTION, SOLUTION INTRAMUSCULAR; INTRAVENOUS AS NEEDED
Status: DISCONTINUED | OUTPATIENT
Start: 2020-07-16 | End: 2020-07-16 | Stop reason: HOSPADM

## 2020-07-16 RX ORDER — LIDOCAINE HYDROCHLORIDE 20 MG/ML
INJECTION, SOLUTION INFILTRATION; PERINEURAL AS NEEDED
Status: DISCONTINUED | OUTPATIENT
Start: 2020-07-16 | End: 2020-07-16 | Stop reason: HOSPADM

## 2020-07-16 RX ORDER — SODIUM CHLORIDE 9 MG/ML
INJECTION, SOLUTION INTRAVENOUS CONTINUOUS PRN
Status: COMPLETED | OUTPATIENT
Start: 2020-07-16 | End: 2020-07-16

## 2020-07-16 RX ORDER — SODIUM CHLORIDE 9 MG/ML
100 INJECTION, SOLUTION INTRAVENOUS CONTINUOUS
Status: DISCONTINUED | OUTPATIENT
Start: 2020-07-16 | End: 2020-07-16 | Stop reason: HOSPADM

## 2020-07-16 RX ORDER — ACETAMINOPHEN 325 MG/1
650 TABLET ORAL EVERY 4 HOURS PRN
Status: DISCONTINUED | OUTPATIENT
Start: 2020-07-16 | End: 2020-07-16 | Stop reason: HOSPADM

## 2020-07-16 RX ORDER — MIDAZOLAM HYDROCHLORIDE 1 MG/ML
INJECTION INTRAMUSCULAR; INTRAVENOUS AS NEEDED
Status: DISCONTINUED | OUTPATIENT
Start: 2020-07-16 | End: 2020-07-16 | Stop reason: HOSPADM

## 2020-07-16 RX ADMIN — SODIUM CHLORIDE 100 ML/HR: 900 INJECTION, SOLUTION INTRAVENOUS at 08:16

## 2020-07-16 RX ADMIN — ACETAMINOPHEN 650 MG: 325 TABLET, FILM COATED ORAL at 12:19

## 2020-07-16 NOTE — H&P
Chief Complaint:      Chief Complaint   Patient presents with   • Coronary Artery Disease   • Hypertension   • Hyperlipidemia         HPI:  Matthew is a very pleasant 89-year-old patient with known coronary artery disease status post coronary artery bypass grafting in 1990 and then repeat coronary artery bypass grafting in 2014.  In 2014 he underwent cardiac catheterization was found to have an 80% lesion in his LAD and a 70% lesion in his circumflex artery with a totally occluded right coronary artery.     At the time he had a severely depressed LV systolic function and underwent ICD implantation.  1/4/2018 which showed an ejection fraction of 35 to 40%, mild to moderate global left ventricular hypokinesis with mild mitral regurgitation and sclerotic changes of the aortic valve.     He also has underlying dyslipidemia hypertension, obstructive sleep apnea and CKD class III.     He recently saw Dr. Domínguez for ICD interrogation.  Please see Dr. Domínguez's note.  Last he presents with one episode of chest pain which is a new complaint to me occurred in the middle of the night and woke him from sleep.  He has had other recurrences of this but only while seated some occurring shakila-prandially.  He has no worsening with exertion.  He has no associated diaphoresis shortness of breath.  Self-limiting lasting no more than 1 to 3 minutes.     Patient returns today with worsening exertional dyspnea with associated chest pain lasting 2 to 5 minutes resolved with rest.  However he is able to walk through or walk off the chest pain at times.  He overall shortness of breath has decreased his quality of life and limited him significantly.  Denies any associated nausea or diaphoresis.     The following portions of the patient's history were reviewed and updated as appropriate: allergies, current medications, past family history, past medical history, past social history, past surgical history and problem list.     Problem List:         Patient Active Problem List   Diagnosis   • CHICO (obstructive sleep apnea)   • Coronary artery disease due to lipid rich plaque   • Mixed hyperlipidemia   • Essential hypertension   • Stage 3 chronic kidney disease (CMS/[facility])   • Dementia (CMS/[facility])   • ICD (implantable cardioverter-defibrillator), dual, in situ   • Chest pain, atypical         Past Medical History:  Medical History        Past Medical History:   Diagnosis Date   • CAD (coronary artery disease)     • Chest pain, atypical 1/11/2020   • Chronic kidney disease     • Dementia (CMS/[facility]) 8/1/2019   • History of echocardiogram 01/04/2018     EF 35-40%, Mild-Mod golbal hypokinesis, Mild MR/TR, RVSP 38 mmHg, AV sclerotic and Mild calcification   • Hyperlipidemia     • Hypertension     • CHICO (obstructive sleep apnea)              Past Surgical History:  Surgical History         Past Surgical History:   Procedure Laterality Date   • CARDIAC CATHETERIZATION   05/2014     EF 50%, Mid LAD 80% stenosis, Circ 70% stenosis, % occluded   • CARDIAC DEFIBRILLATOR PLACEMENT   07/2014     [employer]   • CORONARY ARTERY BYPASS GRAFT                Social History:  Social History   Social History            Socioeconomic History   • Marital status:        Spouse name: Not on file   • Number of children: Not on file   • Years of education: Not on file   • Highest education level: Not on file   Tobacco Use   • Smoking status: Former Smoker   • Smokeless tobacco: Never Used   Substance and Sexual Activity   • Alcohol use: No       Frequency: Never   • Drug use: No   • Sexual activity: Defer            Allergies:  Allergies   Allergen Reactions   • Morphine Hives   • Penicillins Hives            Review of Symptoms:  Constitutional: Patient afebrile no chills or unexpected weight changes  Respiratory: No cough, no wheezing or dyspnea  Cardiovascular: Today he has no chest pain, palpitations, dyspnea, orthopnea and no edema  Gastrointestinal: No nausea, vomiting,  "constipation or diarrhea.  No melena or dark stools     All other systems reviewed and are negative                 Objective:      Objective          /70 (BP Location: Left arm, Patient Position: Sitting, Cuff Size: Large Adult)   Pulse 57   Resp 20   Ht 172.7 cm (68\")   Wt 67.2 kg (148 lb 3.2 oz)   SpO2 95%   BMI 22.53 kg/m²      Physical exam  Constitutional: well-nourished, and appears stated age in no acute distress  PERRL: Conjunctiva clear, no pallor, anicteric  HENMT: normocephalic, normal dentition, no cyanosis or pallor  Neck:no bruits, or thrills and bilateral normal carotid upstroke. Normal jugular venous pressure  Cardiovascular: No parasternal heaves an non-displaced focal PMI. Normal rate and rhythm: no rub, gallop, murmur or click and normal S1 and S2; no lower or upper extremity edema.   Lungs: unlabored, no wheezing with no rales or rhonchi on auscultation.  Extremities: Warm, no clubbing, cyanosis. Full and equal peripheral pulses in extremities with no bruits appreciated.   Abdomen: soft, non-tender, non-distended  Musculoskeletal: no joint tenderness or swelling and no erythema  Skin: Warm and dry, non-erythematous   Neuro:alert and normal affect. Oriented to time, place and person.         In-Office Procedure(s):  Procedures     ASCVD RIsk Score::  The ASCVD Risk score ([place] DC Jr., et al., 2013) failed to calculate for the following reasons:    The 2013 ASCVD risk score is only valid for ages 40 to 79     Recent Radiology:      Imaging Results (Most Recent)      None             Lab Review:         No visits with results within 2 Month(s) from this visit.   Latest known visit with results is:   Office Visit Converted on 04/08/2019   Component Date Value   • Albumin 10/08/2018 4.3    • Alkaline Phosphatase 10/08/2018 112    • Basophils Absolute 10/08/2018 51 CELLS/UL    • Basophil Rel % 10/08/2018 0.4    • Total Bilirubin 10/08/2018 0.7    • BUN 10/08/2018 32*   • BUN/Creatinine " Ratio 10/08/2018 21 (calc)    • Calcium 10/08/2018 9.2    • Chloride 10/08/2018 102    • Chol/HDL Ratio 10/08/2018 3.0 (calc)    • Total Cholesterol 10/08/2018 134    • CO2 CONTENT  10/08/2018 25    • Creatinine 10/08/2018 1.49*   • eGFR  Am 10/08/2018 41*   • eGFR Non  Am 10/08/2018 48*   • Eosinophils Absolute 10/08/2018 356 CELLS/UL    • Eosinophil Rel % 10/08/2018 2.8    • Globulin 10/08/2018 3.7 G/DL (CALC)    • Glucose 10/08/2018 93    • Hematocrit 10/08/2018 39.2    • HDL Cholesterol 10/08/2018 45    • Hemoglobin 10/08/2018 13.2    • LDL Cholesterol  10/08/2018 69 MG/DL (CALC)    • Lymphocytes Absolute 10/08/2018 2502 CELLS/UL    • Lymphocyte Rel % 10/08/2018 19.7    • MCH 10/08/2018 31.1    • MCHC 10/08/2018 33.7 G/DL    • MCV 10/08/2018 92.2    • Monocyte Rel % 10/08/2018 8.3    • Monocytes Absolute 10/08/2018 1054 CELLS/UL*   • MPV 10/08/2018 9.9    • Neutrophils Absolute 10/08/2018 8738 CELLS/UL*   • Platelets 10/08/2018 288 THOUSAND/UL    • Neutrophils, Fluid 10/08/2018 68.8    • Potassium 10/08/2018 4.0    • Total Protein 10/08/2018 8.0    • RBC 10/08/2018 4.25 MILLION/UL    • RDW 10/08/2018 11.9    • AST (SGOT) 10/08/2018 16    • ALT (SGPT) 10/08/2018 8*   • Sodium 10/08/2018 140    • Triglycerides 10/08/2018 123    • WBC 10/08/2018 12.7 THOUSAND/UL*   • A/G Ratio 10/08/2018 1.2 (calc)    • Albumin 04/08/2019 4.7    • Alkaline Phosphatase 04/08/2019 99    • Basophils Absolute 04/08/2019 62 CELLS/UL    • Basophil Rel % 04/08/2019 0.6    • Total Bilirubin 04/08/2019 0.5    • BUN 04/08/2019 31*   • BUN/Creatinine Ratio 04/08/2019 19 (calc)    • Calcium 04/08/2019 9.2    • Chloride 04/08/2019 99    • Chol/HDL Ratio 04/08/2019 3.4 (calc)    • Total Cholesterol 04/08/2019 149    • CO2 CONTENT  04/08/2019 30    • Creatinine 04/08/2019 1.63*   • eGFR  Am 04/08/2019 37*   • eGFR Non African Am 04/08/2019 43*   • Eosinophils Absolute 04/08/2019 515 CELLS/UL*   • Eosinophil Rel % 04/08/2019  5.0    • Globulin 04/08/2019 3.4 G/DL (CALC)    • Glucose 04/08/2019 85    • Hematocrit 04/08/2019 39.8    • HDL Cholesterol 04/08/2019 44    • Hemoglobin 04/08/2019 13.6    • LDL Cholesterol  04/08/2019 79 MG/DL (CALC)    • Lymphocytes Absolute 04/08/2019 2379 CELLS/UL    • Lymphocyte Rel % 04/08/2019 23.1    • MCH 04/08/2019 31.6    • MCHC 04/08/2019 34.2 G/DL    • MCV 04/08/2019 92.3    • Monocyte Rel % 04/08/2019 7.4    • Monocytes Absolute 04/08/2019 762 CELLS/UL    • MPV 04/08/2019 10.4    • Neutrophils Absolute 04/08/2019 6582 CELLS/UL    • Platelets 04/08/2019 234 THOUSAND/UL    • Neutrophils, Fluid 04/08/2019 63.9    • Potassium 04/08/2019 3.9    • Total Protein 04/08/2019 8.1    • RBC 04/08/2019 4.31 MILLION/UL    • RDW 04/08/2019 12.3    • AST (SGOT) 04/08/2019 19    • ALT (SGPT) 04/08/2019 8*   • Sodium 04/08/2019 140    • Triglycerides 04/08/2019 164*   • WBC 04/08/2019 10.3 THOUSAND/UL    • A/G Ratio 04/08/2019 1.4 (calc)                 Invalid input(s): ALKPO4                         Invalid input(s): LDLCALC                   Assessment:      Assessment            Diagnosis Plan   1. Coronary artery disease due to lipid rich plaque      2. Essential hypertension      3. ICD (implantable cardioverter-defibrillator), dual, in situ      4. Mixed hyperlipidemia      5. CHICO (obstructive sleep apnea)               Plan:      Plan          1. Coronary artery disease due to lipid rich plaque  Concern for unstable angina in terms of frequency and new onset of anginal equivalent.  Right and left heart catheterization.  Also may have decreased LV systolic function.  Will evaluate with left ventriculography.     2. Essential hypertension  Well-controlled on medical therapy     3. ICD (implantable cardioverter-defibrillator), dual, in situ  Normal functioning     4. Mixed hyperlipidemia  Well-controlled     5. CHICO (obstructive sleep apnea)  CPAP

## 2020-07-16 NOTE — DISCHARGE INSTR - ACTIVITY
Follow up with Dr. Golden in the office  Please call office to make an appointment [phone]      Post Cath Instructions      1) Drink plenty of fluids for the next 24 hours.  This helps to eliminate the dye used in your procedure through urination.  You may resume a normal diet; however, try to avoid foods that would cause gas or constipation.    2) Sedative medication given to you during your catheterization may decrease your judgement and reaction time for up to 24-48 hours.  Therefore:  a. DO NOT drive or operate hazardous machinery (48 hours)  b. DO NOT consume alcoholic beverages  c. DO NOT make any important/legal decisions  d. Have someone stay with you for at least 24 hours    3) To allow proper healing and prevent bleeding, the following activities are to be strictly avoided for the next 24-48 hours:  a. Excessive bending at wound site  b. Straining (anything that would tense up muscles around the affected puncture site)  c. Lifting objects greater than 5 pounds, pushing, or pulling for 5 days  i. For Arm Cases:  1. No flexing at the puncture site, such as hammering, golfing, bowling, or swinging any objects  ii. For Groin Cases:  1. Refrain from sexual activity  2. Refrain from running or vigorous walking  3. No prolonged sitting or standing  4. Limit stair climbing as much as possible    4) Keep the puncture site clean and dry.  You may remove the dressing tomorrow and replace it with a band-aid for at least one additional day.  Gently clean the site with mild soap and water.  No scrubbing/rubbing and lightly pat the area dry.  Showers are acceptable; however, avoid submerging in water (tub baths, hot tubs, swimming pools, dishwater, etc…) for at least one week.  The site should be completely healed before resuming these activities to reduce the risk of infection.  Check the site often.  Watch for signs and symptoms of infection and notify your physician if any of the following occur:  a. Bleeding  or an increase in swelling at the puncture site  b. Fever  c. Increased soreness around puncture site  d. Foul odor or significant drainage from the puncture site  e. Swelling, redness, or warmth at the puncture site    **A bruise or small “pea sized” lump under the skin at the puncture site is not unusual.  This should disappear within 3-4 weeks.**  5) CONTACT YOUR PHYSICIAN OR CALL 911 IF YOU EXPERIENCE ANY OF THE FOLLOWING:  a. Increased angina (chest pain) or frequent sensations of pressure, burning, pain, or other discomfort in the chest, arm, jaws, or stomach  b. Lightheadedness, dizziness, faint feeling, sweating, or difficulty breathing  c. Odd sensation changes like numbness, tingling, coldness, or pain in the arm or leg in which the catheter was inserted  d. Limb in which the catheter was inserted becomes pale/bluish in color    IMPORTANT:  Although this occurs very rarely, if you should develop bright red or excessive bleeding, feel a “pop” inside at the insertion site, or notice a sudden increase in swelling larger than a walnut, you should call 911.  Hold continuous firm pressure to the access site until emergency personnel arrive.  It is best if someone else can do this for you.

## 2020-11-30 ENCOUNTER — TELEPHONE (OUTPATIENT)
Dept: CARDIOLOGY | Facility: CLINIC | Age: 85
End: 2020-11-30

## 2020-11-30 NOTE — TELEPHONE ENCOUNTER
MPF  Pt's daughter called stating Dr Tirado needs cardiac clearance for hernia surgery   ph [phone] fx [phone] please    Pt cb# [phone]

## 2020-12-28 ENCOUNTER — OFFICE VISIT (OUTPATIENT)
Dept: CARDIOLOGY | Facility: CLINIC | Age: 85
End: 2020-12-28

## 2020-12-28 VITALS
SYSTOLIC BLOOD PRESSURE: 102 MMHG | BODY MASS INDEX: 24.75 KG/M2 | WEIGHT: 145 LBS | HEIGHT: 64 IN | DIASTOLIC BLOOD PRESSURE: 64 MMHG | HEART RATE: 63 BPM

## 2020-12-28 DIAGNOSIS — I48.0 AF (PAROXYSMAL ATRIAL FIBRILLATION) (HCC): ICD-10-CM

## 2020-12-28 DIAGNOSIS — I25.10 CORONARY ARTERY DISEASE DUE TO LIPID RICH PLAQUE: Primary | ICD-10-CM

## 2020-12-28 DIAGNOSIS — I25.83 CORONARY ARTERY DISEASE DUE TO LIPID RICH PLAQUE: Primary | ICD-10-CM

## 2020-12-28 PROCEDURE — 93289 INTERROG DEVICE EVAL HEART: CPT | Performed by: INTERNAL MEDICINE

## 2020-12-28 PROCEDURE — 99212 OFFICE O/P EST SF 10 MIN: CPT | Performed by: INTERNAL MEDICINE

## 2020-12-28 NOTE — PROGRESS NOTES
Subjective:     Encounter Date:12/28/2020      Patient ID: Matthew Rosenbaum is a 89 y.o. male.    Chief Complaint:  ICD followup    HPI:  Mr Rosenbaum is an 89 yo patient of Dr. Golden who presents for routine ICD followup.  He has a past medical history significant for HTN, HLD, CKD-3, CAD and ischemic cardiomyopathy.  He also has permanent AF.  He had an ICD implanted in 7/14.     Due to his dementia, he is not able to give any meaningful history of review of systems.  His wife and daughter state that he has been more short of breath recently.      The following portions of the patient's history were reviewed and updated as appropriate: allergies, current medications, past family history, past medical history, past social history, past surgical history and problem list.    Problem List:  Patient Active Problem List   Diagnosis   • CHICO (obstructive sleep apnea)   • Coronary artery disease due to lipid rich plaque   • Mixed hyperlipidemia   • Essential hypertension   • Stage 3 chronic kidney disease   • Dementia (CMS/[facility])   • ICD (implantable cardioverter-defibrillator), dual, in situ   • Chest pain, atypical   • Atypical angina (CMS/[facility])   • Dyspnea on exertion   • AF (paroxysmal atrial fibrillation) (CMS/[facility])       Past Medical History:  Past Medical History:   Diagnosis Date   • CAD (coronary artery disease)    • Chest pain, atypical 1/11/2020   • Chronic kidney disease    • Dementia (CMS/[facility]) 8/1/2019   • History of echocardiogram 01/04/2018    EF 35-40%, Mild-Mod golbal hypokinesis, Mild MR/TR, RVSP 38 mmHg, AV sclerotic and Mild calcification   • Hyperlipidemia    • Hypertension    • CHICO (obstructive sleep apnea)        Past Surgical History:  Past Surgical History:   Procedure Laterality Date   • CARDIAC CATHETERIZATION  05/2014    EF 50%, Mid LAD 80% stenosis, Circ 70% stenosis, % occluded   • CARDIAC CATHETERIZATION N/A 7/16/2020    Procedure: Right and Left Heart Cath;  Surgeon: Rajan Golden  "MD Alex;  Location: [facility] CATH INVASIVE LOCATION;  Service: Cardiology;  Laterality: N/A;   • CARDIAC DEFIBRILLATOR PLACEMENT  07/2014    [employer]   • CORONARY ARTERY BYPASS GRAFT         Social History:  Social History     Socioeconomic History   • Marital status:      Spouse name: Not on file   • Number of children: Not on file   • Years of education: Not on file   • Highest education level: Not on file   Tobacco Use   • Smoking status: Former Smoker   • Smokeless tobacco: Never Used   Substance and Sexual Activity   • Alcohol use: No     Frequency: Never   • Drug use: No   • Sexual activity: Defer       Allergies:  Allergies   Allergen Reactions   • Morphine Hives   • Penicillins Hives       Immunizations:    There is no immunization history on file for this patient.    ROS:  Review of Systems   Constitution: Positive for malaise/fatigue.   Cardiovascular: Positive for dyspnea on exertion. Negative for chest pain, irregular heartbeat, near-syncope, palpitations and syncope.   Respiratory: Positive for shortness of breath.           Objective:         /64   Pulse 63   Ht 161.3 cm (63.5\")   Wt 65.8 kg (145 lb)   BMI 25.28 kg/m²     Constitutional:       General: Not in acute distress.     Appearance: Well-developed.   Eyes:      General: No scleral icterus.     Conjunctiva/sclera: Conjunctivae normal.      Pupils: Pupils are equal, round, and reactive to light.   HENT:      Head: Normocephalic and atraumatic.   Neck:      Musculoskeletal: Normal range of motion.      Thyroid: No thyromegaly.   Pulmonary:      Effort: Pulmonary effort is normal.      Breath sounds: Normal breath sounds.   Cardiovascular:      Tachycardia present. Irregularly irregular rhythm.   Abdominal:      General: Bowel sounds are normal.      Palpations: Abdomen is soft.   Musculoskeletal: Normal range of motion.   Skin:     General: Skin is warm and dry.   Neurological:      Mental Status: Alert and oriented to person, " place, and time.         In-Office Procedure(s):  Procedures  ICD eval interpreted by me  MDT [id number]  Battery 2.97V    R wave 1.4mv  Threhsold 4V  Impedance 551 ohms    Events - no VT    ASCVD RIsk Score::  The ASCVD Risk score (Khari CORDOBA Jr., et al., 2013) failed to calculate for the following reasons:    The 2013 ASCVD risk score is only valid for ages 40 to 79    Recent Radiology:  Imaging Results (Most Recent)     None          Lab Review:   No visits with results within 2 Month(s) from this visit.   Latest known visit with results is:   Admission on 07/16/2020, Discharged on 07/16/2020   Component Date Value   • Site 07/16/2020 PA    • Héctor's Test 07/16/2020 N/A    • pH, Arterial 07/16/2020 7.373    • pCO2, Arterial 07/16/2020 45.4    • pO2, Arterial 07/16/2020 37.2*   • HCO3, Arterial 07/16/2020 26.4    • Base Excess, Arterial 07/16/2020 0.8    • O2 Saturation, Arterial 07/16/2020 68.9*   • CO2 Content 07/16/2020 27.8    • Barometric Pressure for * 07/16/2020     • Modality 07/16/2020 Room Air    • FIO2 07/16/2020 21    • Hemodilution 07/16/2020 No    • Héctor's Test 07/16/2020 N/A    • pH, Arterial 07/16/2020 7.381    • pCO2, Arterial 07/16/2020 42.5    • pO2, Arterial 07/16/2020 58.7*   • HCO3, Arterial 07/16/2020 25.2    • Base Excess, Arterial 07/16/2020 0.0    • O2 Saturation, Arterial 07/16/2020 89.4*   • CO2 Content 07/16/2020 26.5    • Barometric Pressure for * 07/16/2020     • Modality 07/16/2020 Room Air    • Hemodilution 07/16/2020 No                 Assessment:          Diagnosis Plan   1. Coronary artery disease due to lipid rich plaque     2. AF (paroxysmal atrial fibrillation) (CMS/[facility])            Plan:      1. Permanent AF - rates uncontrolled, will increase metoprolol to 25mg bid  2. ICD follow up - nornal device function    RTC 6 months      Level of Care:                 Héctor Domínguez MD  12/28/20  .

## 2020-12-29 DIAGNOSIS — R07.9 CHEST PAIN DUE TO GERD: ICD-10-CM

## 2020-12-29 DIAGNOSIS — K21.9 CHEST PAIN DUE TO GERD: ICD-10-CM

## 2020-12-29 RX ORDER — OMEPRAZOLE 40 MG/1
40 CAPSULE, DELAYED RELEASE ORAL 2 TIMES DAILY
Qty: 180 CAPSULE | Refills: 3 | Status: SHIPPED | OUTPATIENT
Start: 2020-12-29

## 2020-12-31 PROBLEM — I48.0 AF (PAROXYSMAL ATRIAL FIBRILLATION) (HCC): Status: ACTIVE | Noted: 2020-12-31

## 2021-01-01 ENCOUNTER — OFFICE VISIT (OUTPATIENT)
Dept: CARDIOLOGY | Facility: CLINIC | Age: 86
End: 2021-01-01

## 2021-01-01 VITALS
SYSTOLIC BLOOD PRESSURE: 104 MMHG | RESPIRATION RATE: 18 BRPM | HEIGHT: 63 IN | BODY MASS INDEX: 25.52 KG/M2 | HEART RATE: 60 BPM | WEIGHT: 144 LBS | OXYGEN SATURATION: 96 % | DIASTOLIC BLOOD PRESSURE: 64 MMHG

## 2021-01-01 VITALS
HEART RATE: 69 BPM | HEIGHT: 64 IN | RESPIRATION RATE: 18 BRPM | BODY MASS INDEX: 24.79 KG/M2 | SYSTOLIC BLOOD PRESSURE: 100 MMHG | DIASTOLIC BLOOD PRESSURE: 62 MMHG | WEIGHT: 145.2 LBS

## 2021-01-01 DIAGNOSIS — R06.09 DYSPNEA ON EXERTION: ICD-10-CM

## 2021-01-01 DIAGNOSIS — I25.83 CORONARY ARTERY DISEASE DUE TO LIPID RICH PLAQUE: Primary | ICD-10-CM

## 2021-01-01 DIAGNOSIS — I25.10 CORONARY ARTERY DISEASE DUE TO LIPID RICH PLAQUE: Primary | ICD-10-CM

## 2021-01-01 DIAGNOSIS — I48.0 AF (PAROXYSMAL ATRIAL FIBRILLATION) (HCC): ICD-10-CM

## 2021-01-01 DIAGNOSIS — Z95.810 ICD (IMPLANTABLE CARDIOVERTER-DEFIBRILLATOR), DUAL, IN SITU: ICD-10-CM

## 2021-01-01 DIAGNOSIS — I10 ESSENTIAL HYPERTENSION: ICD-10-CM

## 2021-01-01 PROCEDURE — 93289 INTERROG DEVICE EVAL HEART: CPT | Performed by: INTERNAL MEDICINE

## 2021-01-01 PROCEDURE — 99212 OFFICE O/P EST SF 10 MIN: CPT | Performed by: INTERNAL MEDICINE

## 2021-01-01 PROCEDURE — 99214 OFFICE O/P EST MOD 30 MIN: CPT | Performed by: INTERNAL MEDICINE

## 2021-01-01 RX ORDER — LANOLIN ALCOHOL/MO/W.PET/CERES
1000 CREAM (GRAM) TOPICAL DAILY
COMMUNITY

## 2021-01-01 RX ORDER — NITROGLYCERIN 0.4 MG/1
TABLET SUBLINGUAL AS NEEDED
COMMUNITY
Start: 2021-01-01

## 2021-01-01 RX ORDER — DONEPEZIL HYDROCHLORIDE 5 MG/1
TABLET, FILM COATED ORAL DAILY
COMMUNITY
Start: 2021-01-01

## 2021-03-17 ENCOUNTER — OFFICE VISIT (OUTPATIENT)
Dept: CARDIOLOGY | Facility: CLINIC | Age: 86
End: 2021-03-17

## 2021-03-17 VITALS
WEIGHT: 147.6 LBS | SYSTOLIC BLOOD PRESSURE: 116 MMHG | DIASTOLIC BLOOD PRESSURE: 74 MMHG | HEIGHT: 64 IN | HEART RATE: 86 BPM | BODY MASS INDEX: 25.2 KG/M2 | RESPIRATION RATE: 18 BRPM

## 2021-03-17 DIAGNOSIS — I10 ESSENTIAL HYPERTENSION: ICD-10-CM

## 2021-03-17 DIAGNOSIS — Z95.810 ICD (IMPLANTABLE CARDIOVERTER-DEFIBRILLATOR), DUAL, IN SITU: ICD-10-CM

## 2021-03-17 DIAGNOSIS — I25.10 CORONARY ARTERY DISEASE DUE TO LIPID RICH PLAQUE: Primary | ICD-10-CM

## 2021-03-17 DIAGNOSIS — I25.83 CORONARY ARTERY DISEASE DUE TO LIPID RICH PLAQUE: Primary | ICD-10-CM

## 2021-03-17 DIAGNOSIS — E78.2 MIXED HYPERLIPIDEMIA: ICD-10-CM

## 2021-03-17 PROCEDURE — 99214 OFFICE O/P EST MOD 30 MIN: CPT | Performed by: INTERNAL MEDICINE

## 2021-03-17 NOTE — PROGRESS NOTES
Subjective:     Encounter Date:03/17/2021      Patient ID: Matthew Rosenbaum is a 89 y.o. male.    Chief Complaint:  Chief Complaint   Patient presents with   • Follow-up       HPI:  Matthew is a very pleasant 89-year-old patient with known coronary artery disease status post coronary artery bypass grafting in 1990 and then repeat coronary artery bypass grafting in 2014.  In 2014 he underwent cardiac catheterization was found to have an 80% lesion in his LAD and a 70% lesion in his circumflex artery with a totally occluded right coronary artery.    At the time he had a severely depressed LV systolic function and underwent ICD implantation.  1/4/2018 which showed an ejection fraction of 35 to 40%, mild to moderate global left ventricular hypokinesis with mild mitral regurgitation and sclerotic changes of the aortic valve.    He also has underlying dyslipidemia hypertension, obstructive sleep apnea and CKD class III.    He recently saw Dr. Domínguez for ICD interrogation.  Please see Dr. Domínguez's note.  Last he presents with one episode of chest pain which is a new complaint to me occurred in the middle of the night and woke him from sleep.  He has had other recurrences of this but only while seated some occurring shakila-prandially.  He has no worsening with exertion.  He has no associated diaphoresis shortness of breath.  Self-limiting lasting no more than 1 to 3 minutes.    Patient returns today with worsening exertional dyspnea with associated chest pain lasting 2 to 5 minutes resolved with rest.  However he is able to walk through or walk off the chest pain at times.  He overall shortness of breath has decreased his quality of life and limited him significantly.  Denies any associated nausea or diaphoresis.  He underwent cardiac catheterization 7/16/2020 the images of which I personally reviewed showed two-vessel CAD with patent grafts to both vessels.    He has no complaints from a cardiovascular standpoint.    The  following portions of the patient's history were reviewed and updated as appropriate: allergies, current medications, past family history, past medical history, past social history, past surgical history and problem list.    Problem List:  Patient Active Problem List   Diagnosis   • CHICO (obstructive sleep apnea)   • Coronary artery disease due to lipid rich plaque   • Mixed hyperlipidemia   • Essential hypertension   • Stage 3 chronic kidney disease (CMS/[facility])   • Dementia (CMS/[facility])   • ICD (implantable cardioverter-defibrillator), dual, in situ   • Chest pain, atypical   • Atypical angina (CMS/[facility])   • Dyspnea on exertion   • AF (paroxysmal atrial fibrillation) (CMS/[facility])       Past Medical History:  Past Medical History:   Diagnosis Date   • CAD (coronary artery disease)    • Chest pain, atypical 1/11/2020   • Chronic kidney disease    • Dementia (CMS/[facility]) 8/1/2019   • History of echocardiogram 01/04/2018    EF 35-40%, Mild-Mod golbal hypokinesis, Mild MR/TR, RVSP 38 mmHg, AV sclerotic and Mild calcification   • Hyperlipidemia    • Hypertension    • CHICO (obstructive sleep apnea)        Past Surgical History:  Past Surgical History:   Procedure Laterality Date   • CARDIAC CATHETERIZATION  05/2014    EF 50%, Mid LAD 80% stenosis, Circ 70% stenosis, % occluded   • CARDIAC CATHETERIZATION N/A 7/16/2020    Procedure: Right and Left Heart Cath;  Surgeon: Rajan Golden MD;  Location: [facility] CATH INVASIVE LOC[facility];  Service: Cardiology;  Laterality: N/A;   • CARDIAC DEFIBRILLATOR PLACEMENT  07/2014    [employer]   • CORONARY ARTERY BYPASS GRAFT         Social History:  Social History     Socioeconomic History   • Marital status:      Spouse name: Not on file   • Number of children: Not on file   • Years of education: Not on file   • Highest education level: Not on file   Tobacco Use   • Smoking status: Former Smoker   • Smokeless tobacco: Never Used   Substance and Sexual Activity   • Alcohol use:  "No   • Drug use: No   • Sexual activity: Defer       Allergies:  Allergies   Allergen Reactions   • Morphine Hives   • Penicillins Hives         Review of Symptoms:  Constitutional: Patient afebrile no chills or unexpected weight changes  Respiratory: No cough, no wheezing or dyspnea  Cardiovascular: Today the patient complains of no chest pain, palpitations, dyspnea, orthopnea and no edema  Gastrointestinal: No nausea, vomiting, constipation or diarrhea.  No melena or dark stools    All other systems reviewed and are negative             Objective:         /74 (BP Location: Left arm, Patient Position: Sitting)   Pulse 86   Resp 18   Ht 161.3 cm (63.5\")   Wt 67 kg (147 lb 9.6 oz)   BMI 25.74 kg/m²     Physical exam  Constitutional: well-nourished, and appears stated age in no acute distress  PERRL: Conjunctiva clear, no pallor, anicteric  HENMT: normocephalic, normal dentition, no cyanosis or pallor  Neck:no bruits, or thrills and bilateral normal carotid upstroke. Normal jugular venous pressure  Cardiovascular: No parasternal heaves an non-displaced focal PMI. Normal rate and rhythm: no rub, gallop, murmur or click and normal S1 and S2; no lower or upper extremity edema.   Lungs: unlabored, no wheezing with no rales or rhonchi on auscultation.  Extremities: Warm, no clubbing, cyanosis. Full and equal peripheral pulses in extremities with no bruits appreciated.   Abdomen: soft, non-tender, non-distended  Musculoskeletal: no joint tenderness or swelling and no erythema  Skin: Warm and dry, non-erythematous   Neuro:alert and normal affect. Oriented to time, place and person.           In-Office Procedure(s):  Procedures    ASCVD RIsk Score::  The ASCVD Risk score ([place] BERYL Jr., et al., 2013) failed to calculate for the following reasons:    The 2013 ASCVD risk score is only valid for ages 40 to 79    Recent Radiology:  Imaging Results (Most Recent)     None          Lab Review:   No visits with results " within 2 Month(s) from this visit.   Latest known visit with results is:   Admission on 07/16/2020, Discharged on 07/16/2020   Component Date Value   • Site 07/16/2020 PA    • Héctor's Test 07/16/2020 N/A    • pH, Arterial 07/16/2020 7.373    • pCO2, Arterial 07/16/2020 45.4    • pO2, Arterial 07/16/2020 37.2*   • HCO3, Arterial 07/16/2020 26.4    • Base Excess, Arterial 07/16/2020 0.8    • O2 Saturation, Arterial 07/16/2020 68.9*   • CO2 Content 07/16/2020 27.8    • Barometric Pressure for * 07/16/2020     • Modality 07/16/2020 Room Air    • FIO2 07/16/2020 21    • Hemodilution 07/16/2020 No    • Héctor's Test 07/16/2020 N/A    • pH, Arterial 07/16/2020 7.381    • pCO2, Arterial 07/16/2020 42.5    • pO2, Arterial 07/16/2020 58.7*   • HCO3, Arterial 07/16/2020 25.2    • Base Excess, Arterial 07/16/2020 0.0    • O2 Saturation, Arterial 07/16/2020 89.4*   • CO2 Content 07/16/2020 26.5    • Barometric Pressure for * 07/16/2020     • Modality 07/16/2020 Room Air    • Hemodilution 07/16/2020 No               Invalid input(s): ALKPO4                        Invalid input(s): LDLCALC                Assessment:          Diagnosis Plan   1. Coronary artery disease due to lipid rich plaque     2. Essential hypertension     3. ICD (implantable cardioverter-defibrillator), dual, in situ     4. Mixed hyperlipidemia            Plan:         1. Coronary artery disease due to lipid rich plaque  Symptomatic widely patent grafts.    2. Essential hypertension  Well-controlled on medical therapy    3. ICD (implantable cardioverter-defibrillator), dual, in situ  Functioning properly.    4. Mixed hyperlipidemia  Stable                 Rajan Golden MD  03/17/21  .

## 2021-07-26 NOTE — PROGRESS NOTES
Subjective:     Encounter Date:07/26/2021      Patient ID: Matthew Rosenbaum is a 89 y.o. male.    Chief Complaint:  Chief Complaint   Patient presents with   • Follow-up       HPI:  Mr Rosenbaum is an 89 yo patient of Dr. Golden who presents for routine ICD followup.  He has a past medical history significant for HTN, HLD, CKD-3, CAD and ischemic cardiomyopathy.  He also has permanent AF.  He had an ICD implanted in 7/14.     Today, he states that he has been doing well without palpitations or ICD shocks.       The following portions of the patient's history were reviewed and updated as appropriate: allergies, current medications, past family history, past medical history, past social history, past surgical history and problem list.    Problem List:  Patient Active Problem List   Diagnosis   • CHICO (obstructive sleep apnea)   • Coronary artery disease due to lipid rich plaque   • Mixed hyperlipidemia   • Essential hypertension   • Stage 3 chronic kidney disease (CMS/[facility])   • Dementia (CMS/[facility])   • ICD (implantable cardioverter-defibrillator), dual, in situ   • Chest pain, atypical   • Atypical angina (CMS/[facility])   • Dyspnea on exertion   • AF (paroxysmal atrial fibrillation) (CMS/[facility])       Past Medical History:  Past Medical History:   Diagnosis Date   • CAD (coronary artery disease)    • Chest pain, atypical 1/11/2020   • Chronic kidney disease    • Dementia (CMS/[facility]) 8/1/2019   • History of echocardiogram 01/04/2018    EF 35-40%, Mild-Mod golbal hypokinesis, Mild MR/TR, RVSP 38 mmHg, AV sclerotic and Mild calcification   • Hyperlipidemia    • Hypertension    • CHICO (obstructive sleep apnea)        Past Surgical History:  Past Surgical History:   Procedure Laterality Date   • CARDIAC CATHETERIZATION  05/2014    EF 50%, Mid LAD 80% stenosis, Circ 70% stenosis, % occluded   • CARDIAC CATHETERIZATION N/A 7/16/2020    Procedure: Right and Left Heart Cath;  Surgeon: Rajan Golden MD;  Location:   "[facility] CATH INVASIVE LOCATION;  Service: Cardiology;  Laterality: N/A;   • CARDIAC DEFIBRILLATOR PLACEMENT  07/2014    [employer]   • CORONARY ARTERY BYPASS GRAFT         Social History:  Social History     Socioeconomic History   • Marital status:      Spouse name: Not on file   • Number of children: Not on file   • Years of education: Not on file   • Highest education level: Not on file   Tobacco Use   • Smoking status: Former Smoker   • Smokeless tobacco: Never Used   Substance and Sexual Activity   • Alcohol use: No   • Drug use: No   • Sexual activity: Defer       Allergies:  Allergies   Allergen Reactions   • Morphine Hives   • Penicillins Hives       Immunizations:    There is no immunization history on file for this patient.    ROS:  Review of Systems   Cardiovascular: Negative for chest pain, irregular heartbeat, palpitations and syncope.   Respiratory: Negative for shortness of breath.    All other systems reviewed and are negative.         Objective:         /62 (BP Location: Left arm, Patient Position: Sitting)   Pulse 69   Resp 18   Ht 161.3 cm (63.5\")   Wt 65.9 kg (145 lb 3.2 oz)   BMI 25.32 kg/m²     Constitutional:       General: Not in acute distress.     Appearance: Well-developed.   Eyes:      General: No scleral icterus.     Conjunctiva/sclera: Conjunctivae normal.      Pupils: Pupils are equal, round, and reactive to light.   HENT:      Head: Normocephalic and atraumatic.   Neck:      Thyroid: No thyromegaly.   Pulmonary:      Effort: Pulmonary effort is normal.      Breath sounds: Normal breath sounds.   Cardiovascular:      Normal rate. Regular rhythm.   Abdominal:      General: Bowel sounds are normal.      Palpations: Abdomen is soft.   Musculoskeletal: Normal range of motion.      Cervical back: Normal range of motion. Skin:     General: Skin is warm and dry.   Neurological:      Mental Status: Alert and oriented to person, place, and time.         In-Office " Procedure(s):  Procedures  ICD eval interpreted by me  MDT [id number]  Battery 2.86V    R wave 1.9mv  Threshold 2.75V  Impedance 646 ohms    HV 46/69 ohms    Events - none    ASCVD RIsk Score::  The ASCVD Risk score ([place] BERYL Al[place]., et al., 2013) failed to calculate for the following reasons:    The 2013 ASCVD risk score is only valid for ages 40 to 79    Recent Radiology:  Imaging Results (Most Recent)     None          Lab Review:   No visits with results within 2 Month(s) from this visit.   Latest known visit with results is:   Admission on 07/16/2020, Discharged on 07/16/2020   Component Date Value   • Site 07/16/2020 PA    • Héctor's Test 07/16/2020 N/A    • pH, Arterial 07/16/2020 7.373    • pCO2, Arterial 07/16/2020 45.4    • pO2, Arterial 07/16/2020 37.2*   • HCO3, Arterial 07/16/2020 26.4    • Base Excess, Arterial 07/16/2020 0.8    • O2 Saturation, Arterial 07/16/2020 68.9*   • CO2 Content 07/16/2020 27.8    • Barometric Pressure for * 07/16/2020     • Modality 07/16/2020 Room Air    • FIO2 07/16/2020 21    • Hemodilution 07/16/2020 No    • Héctor's Test 07/16/2020 N/A    • pH, Arterial 07/16/2020 7.381    • pCO2, Arterial 07/16/2020 42.5    • pO2, Arterial 07/16/2020 58.7*   • HCO3, Arterial 07/16/2020 25.2    • Base Excess, Arterial 07/16/2020 0.0    • O2 Saturation, Arterial 07/16/2020 89.4*   • CO2 Content 07/16/2020 26.5    • Barometric Pressure for * 07/16/2020     • Modality 07/16/2020 Room Air    • Hemodilution 07/16/2020 No                 Assessment:          Diagnosis Plan   1. Coronary artery disease due to lipid rich plaque     2. AF (paroxysmal atrial fibrillation) (CMS/[facility])     3. ICD (implantable cardioverter-defibrillator), dual, in situ            Plan:      1. Permanent AF - rates controlled, on ASA only  2. ICD follow up - nornal device function     RTC 6 months            Level of Care:                 Héctor Domínguez MD  07/26/21  .

## 2021-09-22 NOTE — PROGRESS NOTES
Subjective:     Encounter Date:09/22/2021      Patient ID: Matthew Rosenbaum is a 89 y.o. male.    Chief Complaint:  Chief Complaint   Patient presents with   • Coronary Artery Disease   • Hypertension   • Hyperlipidemia       HPI:  Matthew is a very pleasant 89-year-old patient with known coronary artery disease status post coronary artery bypass grafting in 1990 and then repeat coronary artery bypass grafting in 2014.  In 2014 he underwent cardiac catheterization was found to have an 80% lesion in his LAD and a 70% lesion in his circumflex artery with a totally occluded right coronary artery.    At the time he had a severely depressed LV systolic function and underwent ICD implantation.  1/4/2018 which showed an ejection fraction of 35 to 40%, mild to moderate global left ventricular hypokinesis with mild mitral regurgitation and sclerotic changes of the aortic valve.    He also has underlying dyslipidemia hypertension, obstructive sleep apnea and CKD class III.    He recently saw Dr. Domínguez for ICD interrogation.  Please see Dr. Domínguez's note.  Last he presents with one episode of chest pain which is a new complaint to me occurred in the middle of the night and woke him from sleep.  He has had other recurrences of this but only while seated some occurring shakila-prandially.  He has no worsening with exertion.  He has no associated diaphoresis shortness of breath.  Self-limiting lasting no more than 1 to 3 minutes.    Presented recently with worsening exertional dyspnea with associated chest pain lasting 2 to 5 minutes resolved with rest.  However he is able to walk through or walk off the chest pain at times.  He overall shortness of breath has decreased his quality of life and limited him significantly. He underwent cardiac catheterization 7/16/2020 the images of which I personally reviewed showed two-vessel CAD with patent grafts to both vessels.    Today he presents with no complaints from a cardiovascular  standpoint.      The following portions of the patient's history were reviewed and updated as appropriate: allergies, current medications, past family history, past medical history, past social history, past surgical history and problem list.    Problem List:  Patient Active Problem List   Diagnosis   • CHICO (obstructive sleep apnea)   • Coronary artery disease due to lipid rich plaque   • Mixed hyperlipidemia   • Essential hypertension   • Stage 3 chronic kidney disease (CMS/[facility])   • Dementia (CMS/[facility])   • ICD (implantable cardioverter-defibrillator), dual, in situ   • Chest pain, atypical   • Atypical angina (CMS/[facility])   • Dyspnea on exertion   • AF (paroxysmal atrial fibrillation) (CMS/[facility])       Past Medical History:  Past Medical History:   Diagnosis Date   • CAD (coronary artery disease)    • Chest pain, atypical 1/11/2020   • Chronic kidney disease    • Dementia (CMS/[facility]) 8/1/2019   • History of echocardiogram 01/04/2018    EF 35-40%, Mild-Mod golbal hypokinesis, Mild MR/TR, RVSP 38 mmHg, AV sclerotic and Mild calcification   • Hyperlipidemia    • Hypertension    • CHICO (obstructive sleep apnea)        Past Surgical History:  Past Surgical History:   Procedure Laterality Date   • CARDIAC CATHETERIZATION  05/2014    EF 50%, Mid LAD 80% stenosis, Circ 70% stenosis, % occluded   • CARDIAC CATHETERIZATION N/A 7/16/2020    Procedure: Right and Left Heart Cath;  Surgeon: Rajan Golden MD;  Location: [facility] CATH INVASIVE LOCATION;  Service: Cardiology;  Laterality: N/A;   • CARDIAC DEFIBRILLATOR PLACEMENT  07/2014    [employer]   • CORONARY ARTERY BYPASS GRAFT         Social History:  Social History     Socioeconomic History   • Marital status:      Spouse name: Not on file   • Number of children: Not on file   • Years of education: Not on file   • Highest education level: Not on file   Tobacco Use   • Smoking status: Former Smoker   • Smokeless tobacco: Never Used   Substance and Sexual  Activity   • Alcohol use: No   • Drug use: No   • Sexual activity: Defer       Allergies:  Allergies   Allergen Reactions   • Morphine Hives   • Penicillins Hives           Review of Symptoms:  Constitutional: Patient afebrile no chills or unexpected weight changes  Respiratory: No cough, no wheezing or dyspnea  Cardiovascular: Today the patient complains of no chest pain, palpitations, dyspnea, orthopnea and no edema  Gastrointestinal: No nausea, vomiting, constipation or diarrhea.  No melena or dark stools    All other systems reviewed and are negative           Objective:         There were no vitals taken for this visit.      Physical exam  Constitutional: well-nourished, and appears stated age in no acute distress  PERRL: Conjunctiva clear, no pallor, anicteric  HENMT: normocephalic, normal dentition, no cyanosis or pallor  Neck:no bruits, or thrills and bilateral normal carotid upstroke. Normal jugular venous pressure  Cardiovascular: No parasternal heaves an non-displaced focal PMI. Normal rate and rhythm: no rub, gallop, murmur or click and normal S1 and S2; no lower or upper extremity edema.   Lungs: unlabored, no wheezing with no rales or rhonchi on auscultation.  Extremities: Warm, no clubbing, cyanosis. Full and equal peripheral pulses in extremities with no bruits appreciated.   Abdomen: soft, non-tender, non-distended  Musculoskeletal: no joint tenderness or swelling and no erythema  Skin: Warm and dry, non-erythematous   Neuro:alert and normal affect. Oriented to time, place and person.       In-Office Procedure(s):  Procedures    ASCVD RIsk Score::  The ASCVD Risk score ([place] BERYL Jr., et al., 2013) failed to calculate for the following reasons:    The 2013 ASCVD risk score is only valid for ages 40 to 79    Recent Radiology:  Imaging Results (Most Recent)     None          Lab Review:   No visits with results within 2 Month(s) from this visit.   Latest known visit with results is:   Admission on  07/16/2020, Discharged on 07/16/2020   Component Date Value   • Site 07/16/2020 PA    • Héctor's Test 07/16/2020 N/A    • pH, Arterial 07/16/2020 7.373    • pCO2, Arterial 07/16/2020 45.4    • pO2, Arterial 07/16/2020 37.2*   • HCO3, Arterial 07/16/2020 26.4    • Base Excess, Arterial 07/16/2020 0.8    • O2 Saturation, Arterial 07/16/2020 68.9*   • CO2 Content 07/16/2020 27.8    • Barometric Pressure for * 07/16/2020     • Modality 07/16/2020 Room Air    • FIO2 07/16/2020 21    • Hemodilution 07/16/2020 No    • Héctor's Test 07/16/2020 N/A    • pH, Arterial 07/16/2020 7.381    • pCO2, Arterial 07/16/2020 42.5    • pO2, Arterial 07/16/2020 58.7*   • HCO3, Arterial 07/16/2020 25.2    • Base Excess, Arterial 07/16/2020 0.0    • O2 Saturation, Arterial 07/16/2020 89.4*   • CO2 Content 07/16/2020 26.5    • Barometric Pressure for * 07/16/2020     • Modality 07/16/2020 Room Air    • Hemodilution 07/16/2020 No               Invalid input(s): ALKPO4                        Invalid input(s): LDLCALC                Assessment:          Diagnosis Plan   1. Coronary artery disease due to lipid rich plaque     2. Dyspnea on exertion     3. Essential hypertension     4. AF (paroxysmal atrial fibrillation) (CMS/[facility])     5. ICD (implantable cardioverter-defibrillator), dual, in situ            Plan:         1. Coronary artery disease due to lipid rich plaque  Clinically silent.  Confirmed by most recent cardiac catheterization.  Continue medical therapy.    2. Dyspnea on exertion  Resolved.      3. Essential hypertension  Well-controlled on medical therapy    4. AF (paroxysmal atrial fibrillation) (CMS/[facility]CC)  Stable adequate therapy.    5. ICD (implantable cardioverter-defibrillator), dual, in situ  Functioning properly.         Rajan Golden MD  09/22/21  .

## 2022-01-01 ENCOUNTER — OFFICE VISIT (OUTPATIENT)
Dept: CARDIOLOGY | Facility: CLINIC | Age: 87
End: 2022-01-01

## 2022-01-01 ENCOUNTER — TELEPHONE (OUTPATIENT)
Dept: CARDIOLOGY | Facility: CLINIC | Age: 87
End: 2022-01-01

## 2022-01-01 VITALS
HEART RATE: 122 BPM | BODY MASS INDEX: 25.69 KG/M2 | DIASTOLIC BLOOD PRESSURE: 58 MMHG | RESPIRATION RATE: 20 BRPM | SYSTOLIC BLOOD PRESSURE: 108 MMHG | WEIGHT: 145 LBS | HEIGHT: 63 IN

## 2022-01-01 VITALS
BODY MASS INDEX: 25.69 KG/M2 | SYSTOLIC BLOOD PRESSURE: 92 MMHG | HEART RATE: 74 BPM | DIASTOLIC BLOOD PRESSURE: 60 MMHG | HEIGHT: 63 IN

## 2022-01-01 VITALS
HEART RATE: 71 BPM | DIASTOLIC BLOOD PRESSURE: 68 MMHG | HEIGHT: 63 IN | BODY MASS INDEX: 25.69 KG/M2 | SYSTOLIC BLOOD PRESSURE: 102 MMHG

## 2022-01-01 VITALS
WEIGHT: 145 LBS | SYSTOLIC BLOOD PRESSURE: 106 MMHG | BODY MASS INDEX: 25.69 KG/M2 | HEIGHT: 63 IN | DIASTOLIC BLOOD PRESSURE: 62 MMHG | HEART RATE: 96 BPM

## 2022-01-01 DIAGNOSIS — I10 ESSENTIAL HYPERTENSION: ICD-10-CM

## 2022-01-01 DIAGNOSIS — I25.83 CORONARY ARTERY DISEASE DUE TO LIPID RICH PLAQUE: Primary | ICD-10-CM

## 2022-01-01 DIAGNOSIS — I48.0 AF (PAROXYSMAL ATRIAL FIBRILLATION): ICD-10-CM

## 2022-01-01 DIAGNOSIS — Z95.810 ICD (IMPLANTABLE CARDIOVERTER-DEFIBRILLATOR), DUAL, IN SITU: ICD-10-CM

## 2022-01-01 DIAGNOSIS — J18.9 PNEUMONIA DUE TO INFECTIOUS ORGANISM, UNSPECIFIED LATERALITY, UNSPECIFIED PART OF LUNG: ICD-10-CM

## 2022-01-01 DIAGNOSIS — I25.10 CORONARY ARTERY DISEASE DUE TO LIPID RICH PLAQUE: Primary | ICD-10-CM

## 2022-01-01 DIAGNOSIS — E78.2 MIXED HYPERLIPIDEMIA: ICD-10-CM

## 2022-01-01 DIAGNOSIS — F03.90 DEMENTIA WITHOUT BEHAVIORAL DISTURBANCE, UNSPECIFIED DEMENTIA TYPE: ICD-10-CM

## 2022-01-01 PROCEDURE — 99214 OFFICE O/P EST MOD 30 MIN: CPT | Performed by: INTERNAL MEDICINE

## 2022-01-01 PROCEDURE — 93289 INTERROG DEVICE EVAL HEART: CPT | Performed by: INTERNAL MEDICINE

## 2022-01-01 PROCEDURE — 99212 OFFICE O/P EST SF 10 MIN: CPT | Performed by: INTERNAL MEDICINE

## 2022-01-01 PROCEDURE — 99213 OFFICE O/P EST LOW 20 MIN: CPT | Performed by: INTERNAL MEDICINE

## 2022-01-01 RX ORDER — DOXYCYCLINE HYCLATE 100 MG/1
CAPSULE ORAL
COMMUNITY
Start: 2022-01-01

## 2022-01-01 RX ORDER — DOCUSATE SODIUM 100 MG/1
100 CAPSULE, LIQUID FILLED ORAL 2 TIMES DAILY
COMMUNITY

## 2022-01-01 RX ORDER — AZITHROMYCIN 250 MG/1
TABLET, FILM COATED ORAL
Qty: 6 TABLET | Refills: 0 | Status: SHIPPED | OUTPATIENT
Start: 2022-01-01

## 2022-01-01 RX ORDER — ALBUTEROL SULFATE 90 UG/1
2 AEROSOL, METERED RESPIRATORY (INHALATION) EVERY 4 HOURS PRN
COMMUNITY

## 2022-01-05 PROBLEM — J18.9 PNEUMONIA DUE TO INFECTIOUS ORGANISM: Status: ACTIVE | Noted: 2022-01-01

## 2022-01-05 NOTE — PROGRESS NOTES
Subjective:     Encounter Date:01/05/2022      Patient ID: Matthew Rosenbaum is a 89 y.o. male.    Chief Complaint:  Chief Complaint   Patient presents with   • Coronary Artery Disease   • Atrial Fibrillation   • Hypertension       HPI:  Matthew is a very pleasant 89-year-old patient with known coronary artery disease status post coronary artery bypass grafting in 1990 and then repeat coronary artery bypass grafting in 2014.  In 2014 he underwent cardiac catheterization was found to have an 80% lesion in his LAD and a 70% lesion in his circumflex artery with a totally occluded right coronary artery.    At the time he had a severely depressed LV systolic function and underwent ICD implantation.  1/4/2018 which showed an ejection fraction of 35 to 40%, mild to moderate global left ventricular hypokinesis with mild mitral regurgitation and sclerotic changes of the aortic valve.    He also has underlying dyslipidemia hypertension, obstructive sleep apnea and CKD class III.    He recently saw Dr. Doímnguez for ICD interrogation.  Please see Dr. Domínguez's note.  Last he presents with one episode of chest pain which is a new complaint to me occurred in the middle of the night and woke him from sleep.  He has had other recurrences of this but only while seated some occurring shakila-prandially.  He has no worsening with exertion.  He has no associated diaphoresis shortness of breath.  Self-limiting lasting no more than 1 to 3 minutes.    Presented recently with worsening exertional dyspnea with associated chest pain lasting 2 to 5 minutes resolved with rest.  However he is able to walk through or walk off the chest pain at times.  He overall shortness of breath has decreased his quality of life and limited him significantly. He underwent cardiac catheterization 7/16/2020 the images of which I personally reviewed showed two-vessel CAD with patent grafts to both vessels.      Today he presents with no complaints from a cardiovascular  standpoint.  However he has increasing malaise and fatigue with severe exertional dyspnea.  He has subjective chills but no fevers.  His heart rate is also in the low 1 teens.      The following portions of the patient's history were reviewed and updated as appropriate: allergies, current medications, past family history, past medical history, past social history, past surgical history and problem list.    Problem List:  Patient Active Problem List   Diagnosis   • CHICO (obstructive sleep apnea)   • Coronary artery disease due to lipid rich plaque   • Mixed hyperlipidemia   • Essential hypertension   • Stage 3 chronic kidney disease ([facility])   • Dementia ([facility])   • ICD (implantable cardioverter-defibrillator), dual, in situ   • Chest pain, atypical   • Atypical angina ([facility])   • Dyspnea on exertion   • AF (paroxysmal atrial fibrillation) ([facility])   • Pneumonia due to infectious organism       Past Medical History:  Past Medical History:   Diagnosis Date   • CAD (coronary artery disease)    • Chest pain, atypical 1/11/2020   • Chronic kidney disease    • Dementia ([facility]) 8/1/2019   • History of echocardiogram 01/04/2018    EF 35-40%, Mild-Mod golbal hypokinesis, Mild MR/TR, RVSP 38 mmHg, AV sclerotic and Mild calcification   • Hyperlipidemia    • Hypertension    • CHICO (obstructive sleep apnea)    • Pneumonia due to infectious organism 1/5/2022       Past Surgical History:  Past Surgical History:   Procedure Laterality Date   • CARDIAC CATHETERIZATION  05/2014    EF 50%, Mid LAD 80% stenosis, Circ 70% stenosis, % occluded   • CARDIAC CATHETERIZATION N/A 7/16/2020    Procedure: Right and Left Heart Cath;  Surgeon: Rajan Golden MD;  Location: [facility] CATH INVASIVE LOCATION;  Service: Cardiology;  Laterality: N/A;   • CARDIAC DEFIBRILLATOR PLACEMENT  07/2014    [employer]   • CORONARY ARTERY BYPASS GRAFT         Social History:  Social History     Socioeconomic History   • Marital status:    Tobacco Use   •  "Smoking status: Former Smoker   • Smokeless tobacco: Never Used   Substance and Sexual Activity   • Alcohol use: No   • Drug use: No   • Sexual activity: Defer       Allergies:  Allergies   Allergen Reactions   • Morphine Hives   • Penicillins Hives           Review of Symptoms:  Constitutional: Patient afebrile no chills or unexpected weight changes  Respiratory: No cough, no wheezing with worsening dyspnea  Cardiovascular: Today the patient complains of no chest pain, palpitations, with worsening dyspnea, orthopnea and no edema  Gastrointestinal: No nausea, vomiting, constipation or diarrhea.  No melena or dark stools    All other systems reviewed and are negative           Objective:         /58   Pulse (!) 122   Resp 20   Ht 160 cm (63\")   Wt 65.8 kg (145 lb)   BMI 25.69 kg/m²       Physical exam  Constitutional: well-nourished, and appears stated age in no acute distress  PERRL: Conjunctiva clear, no pallor, anicteric  HENMT: normocephalic, normal dentition, no cyanosis or pallor  Neck:no bruits, or thrills and bilateral normal carotid upstroke. Normal jugular venous pressure  Cardiovascular: No parasternal heaves an non-displaced focal PMI. Normal rate and rhythm: no rub, gallop, murmur or click and normal S1 and S2; no lower or upper extremity edema.   Lungs: unlabored, bilateral anterior lung field wheezing with diffuse bilateral rales no rhonchi on auscultation.  Extremities: Warm, no clubbing, cyanosis. Full and equal peripheral pulses in extremities with no bruits appreciated.   Abdomen: soft, non-tender, non-distended  Musculoskeletal: no joint tenderness or swelling and no erythema  Skin: Warm and dry, non-erythematous   Neuro:alert and normal affect. Oriented to time, place and person.       In-Office Procedure(s):  Procedures    ASCVD RIsk Score::  The ASCVD Risk score ([place] Jr., et al., 2013) failed to calculate for the following reasons:    The 2013 ASCVD risk score is only valid for " ages 40 to 79    Recent Radiology:  Imaging Results (Most Recent)     None          Lab Review:   No visits with results within 2 Month(s) from this visit.   Latest known visit with results is:   Admission on 07/16/2020, Discharged on 07/16/2020   Component Date Value   • Site 07/16/2020 PA    • Héctor's Test 07/16/2020 N/A    • pH, Arterial 07/16/2020 7.373    • pCO2, Arterial 07/16/2020 45.4    • pO2, Arterial 07/16/2020 37.2*   • HCO3, Arterial 07/16/2020 26.4    • Base Excess, Arterial 07/16/2020 0.8    • O2 Saturation, Arterial 07/16/2020 68.9*   • CO2 Content 07/16/2020 27.8    • Barometric Pressure for * 07/16/2020     • Modality 07/16/2020 Room Air    • FIO2 07/16/2020 21    • Hemodilution 07/16/2020 No    • Héctor's Test 07/16/2020 N/A    • pH, Arterial 07/16/2020 7.381    • pCO2, Arterial 07/16/2020 42.5    • pO2, Arterial 07/16/2020 58.7*   • HCO3, Arterial 07/16/2020 25.2    • Base Excess, Arterial 07/16/2020 0.0    • O2 Saturation, Arterial 07/16/2020 89.4*   • CO2 Content 07/16/2020 26.5    • Barometric Pressure for * 07/16/2020     • Modality 07/16/2020 Room Air    • Hemodilution 07/16/2020 No               Invalid input(s): ALKPO4                        Invalid input(s): LDLCALC                Assessment:          Diagnosis Plan   1. Coronary artery disease due to lipid rich plaque     2. Mixed hyperlipidemia     3. ICD (implantable cardioverter-defibrillator), dual, in situ     4. Essential hypertension     5. Pneumonia due to infectious organism, unspecified laterality, unspecified part of lung  azithromycin (Zithromax Z-Pantera) 250 MG tablet    XR Chest 2 View          Plan:         1. Coronary artery disease due to lipid rich plaque  Niccoli silent.  Continue medical therapy in the form of statin.    2. Mixed hyperlipidemia  Well-controlled on medical therapy above    3. ICD (implantable cardioverter-defibrillator), dual, in situ  Functioning well    4. Essential hypertension  Borderline hypotension  today with heart rate 122    5. Pneumonia due to infectious organism, unspecified laterality, unspecified part of lung  Clinically he appears to have pneumonia by physical exam and clinical findings.  We'll begin treatment with azithromycin.  If he shows no improvement he will have to follow-up with his primary care physician for antibiotic tailoring.  - azithromycin (Zithromax Z-Pantera) 250 MG tablet; Take 2 tablets by mouth on day 1, then 1 tablet daily on days 2-5  Dispense: 6 tablet; Refill: 0  - XR Chest 2 View; Future                 Rajan Golden MD  01/05/22  .

## 2022-01-13 ENCOUNTER — OFFICE (OUTPATIENT)
Dept: URBAN - METROPOLITAN AREA CLINIC 64 | Facility: CLINIC | Age: 87
End: 2022-01-13

## 2022-01-13 VITALS — HEIGHT: 70 IN | SYSTOLIC BLOOD PRESSURE: 116 MMHG | DIASTOLIC BLOOD PRESSURE: 81 MMHG | HEART RATE: 87 BPM

## 2022-01-13 DIAGNOSIS — K59.00 CONSTIPATION, UNSPECIFIED: ICD-10-CM

## 2022-01-13 DIAGNOSIS — R15.0 INCOMPLETE DEFECATION: ICD-10-CM

## 2022-01-13 PROCEDURE — 99203 OFFICE O/P NEW LOW 30 MIN: CPT | Performed by: NURSE PRACTITIONER

## 2022-01-13 RX ORDER — DICYCLOMINE HYDROCHLORIDE 10 MG/1
20 CAPSULE ORAL
Qty: 60 | Refills: 6 | Status: ACTIVE
Start: 2022-01-13

## 2022-01-24 NOTE — PROGRESS NOTES
Subjective:     Encounter Date:01/24/2022      Patient ID: Matthew Rosenbaum is a 89 y.o. male.    Chief Complaint:  Chief Complaint   Patient presents with   • Coronary Artery Disease       HPI:  Mr Rosenbaum is an 89 yo patient of Dr. Golden who presents for routine ICD followup.  He has a past medical history significant for HTN, HLD, CKD-3, CAD and ischemic cardiomyopathy.  He also has permanent AF.  He had an ICD implanted in 7/14.     Today, he states that he has been doing well without palpitations or ICD shocks.            The following portions of the patient's history were reviewed and updated as appropriate: allergies, current medications, past family history, past medical history, past social history, past surgical history and problem list.    Problem List:  Patient Active Problem List   Diagnosis   • CHICO (obstructive sleep apnea)   • Coronary artery disease due to lipid rich plaque   • Mixed hyperlipidemia   • Essential hypertension   • Stage 3 chronic kidney disease ([facility])   • Dementia ([facility])   • ICD (implantable cardioverter-defibrillator), dual, in situ   • Chest pain, atypical   • Atypical angina ([facility])   • Dyspnea on exertion   • AF (paroxysmal atrial fibrillation) ([facility])   • Pneumonia due to infectious organism       Active Med List:    Current Outpatient Medications:   •  aspirin 81 MG EC tablet, Take 81 mg by mouth Daily., Disp: , Rfl:   •  azithromycin (Zithromax Z-Pantera) 250 MG tablet, Take 2 tablets by mouth on day 1, then 1 tablet daily on days 2-5, Disp: 6 tablet, Rfl: 0  •  docusate sodium (COLACE) 100 MG capsule, Take 100 mg by mouth 2 (Two) Times a Day., Disp: , Rfl:   •  donepezil (ARICEPT) 5 MG tablet, Daily., Disp: , Rfl:   •  finasteride (PROSCAR) 5 MG tablet, Take 5 mg by mouth Daily., Disp: , Rfl:   •  furosemide (LASIX) 80 MG tablet, Take 80 mg by mouth Daily., Disp: , Rfl:   •  Multiple Vitamins-Minerals (EYE VITAMINS & MINERALS PO), Take 1 tablet by mouth Daily., Disp: , Rfl:   •   nitroglycerin (NITROSTAT) 0.4 MG SL tablet, As Needed., Disp: , Rfl:   •  Omega-3 1000 MG capsule, Take 1 tablet by mouth Daily., Disp: , Rfl:   •  omeprazole (priLOSEC) 40 MG capsule, TAKE 1 CAPSULE BY MOUTH 2 (TWO) TIMES A DAY. (Patient taking differently: Take 20 mg by mouth 2 (Two) Times a Day.), Disp: 180 capsule, Rfl: 3  •  potassium chloride (K-DUR,KLOR-CON) 10 MEQ CR tablet, TAKE 1 TABLET EVERY DAY, Disp: 90 tablet, Rfl: 1  •  pravastatin (PRAVACHOL) 40 MG tablet, Take 40 mg by mouth Daily., Disp: , Rfl:   •  rOPINIRole (REQUIP) 0.5 MG tablet, Take 0.5 mg by mouth Every Night. Take 1 hour before bedtime., Disp: , Rfl:   •  tamsulosin (FLOMAX) 0.4 MG capsule 24 hr capsule, Take 1 capsule by mouth Every Night., Disp: , Rfl:   •  vitamin B-12 (CYANOCOBALAMIN) 1000 MCG tablet, Take 1,000 mcg by mouth Daily., Disp: , Rfl:      Past Medical History:  Past Medical History:   Diagnosis Date   • CAD (coronary artery disease)    • Chest pain, atypical 1/11/2020   • Chronic kidney disease    • Dementia (HCC) 8/1/2019   • History of echocardiogram 01/04/2018    EF 35-40%, Mild-Mod golbal hypokinesis, Mild MR/TR, RVSP 38 mmHg, AV sclerotic and Mild calcification   • Hyperlipidemia    • Hypertension    • CHICO (obstructive sleep apnea)    • Pneumonia due to infectious organism 1/5/2022       Past Surgical History:  Past Surgical History:   Procedure Laterality Date   • CARDIAC CATHETERIZATION  05/2014    EF 50%, Mid LAD 80% stenosis, Circ 70% stenosis, % occluded   • CARDIAC CATHETERIZATION N/A 7/16/2020    Procedure: Right and Left Heart Cath;  Surgeon: Rajan Golden MD;  Location: [facility] CATH INVASIVE LOCATION;  Service: Cardiology;  Laterality: N/A;   • CARDIAC DEFIBRILLATOR PLACEMENT  07/2014    [employer]   • CORONARY ARTERY BYPASS GRAFT         Social History:  Social History     Socioeconomic History   • Marital status:    Tobacco Use   • Smoking status: Former Smoker   • Smokeless tobacco:  "Never Used   Substance and Sexual Activity   • Alcohol use: No   • Drug use: No   • Sexual activity: Defer       Allergies:  Allergies   Allergen Reactions   • Morphine Hives   • Penicillins Hives       Immunizations:    There is no immunization history on file for this patient.       Objective:         Review of Systems   Respiratory: Negative for shortness of breath.    Cardiovascular: Negative for chest pain, palpitations and leg swelling.   All other systems reviewed and are negative.       /62   Pulse 96   Ht 160 cm (63\")   Wt 65.8 kg (145 lb)   BMI 25.69 kg/m²     Constitutional:       General: Not in acute distress.     Appearance: Well-developed.   Eyes:      General: No scleral icterus.     Conjunctiva/sclera: Conjunctivae normal.      Pupils: Pupils are equal, round, and reactive to light.   HENT:      Head: Normocephalic and atraumatic.   Neck:      Thyroid: No thyromegaly.   Pulmonary:      Effort: Pulmonary effort is normal.      Breath sounds: Normal breath sounds.   Cardiovascular:      Normal rate. Regular rhythm.   Abdominal:      General: Bowel sounds are normal.      Palpations: Abdomen is soft.   Musculoskeletal: Normal range of motion.      Cervical back: Normal range of motion. Skin:     General: Skin is warm and dry.   Neurological:      Mental Status: Alert and oriented to person, place, and time.         In-Office Procedure(s):  Procedures  No orders to display      Procedures  ICD eval interpreted by me  MDT [id number]  Battery 2.86V     R wave 1.6mv  Threshold 2.75V  Impedance 670 ohms     HV 48/66 ohms     Events - none    ASCVD RIsk Score::  The ASCVD Risk score ([place] BERYL Jr., et al., 2013) failed to calculate for the following reasons:    The 2013 ASCVD risk score is only valid for ages 40 to 79    Recent Radiology:          Lab Review:       Recent labs reviewed and interpreted for clinical significance and application          Assessment:          Diagnosis Plan   1. Coronary " artery disease due to lipid rich plaque     2. Essential hypertension     3. ICD (implantable cardioverter-defibrillator), dual, in situ     4. AF (paroxysmal atrial fibrillation) ([facility])            Plan:      1. Permanent AF - rates controlled, on ASA only    2. ICD follow up - nornal device function, chronically low R wave and high threshold     RTC 6 months            Level of Care:                 Héctor Domínguez MD  01/24/22

## 2022-03-07 NOTE — PROGRESS NOTES
Subjective:     Encounter Date:03/07/2022      Patient ID: Matthew Rosenbaum is a 89 y.o. male.    Chief Complaint:  Chief Complaint   Patient presents with   • Follow-up       HPI:  Mr Rosenbaum is an 89 yo patient of Dr. Golden who resides in a NH and presents with increased dyspnea.  He has a past medical history significant for HTN, HLD, CKD-3, CAD, ischemic cardiomyopathy and dementia.  He also has permanent AF.  He had an ICD implanted in 7/14.     His daughter was visiting him at the NH and noted that he was coughing and appeared dyspneic.  He apparently had a CXR done which showed pneumonia.  He was placed on doxycycline and has had some improvement.  The patient is not able to give any meaningful history due to his dementia.            The following portions of the patient's history were reviewed and updated as appropriate: allergies, current medications, past family history, past medical history, past social history, past surgical history and problem list.    Problem List:  Patient Active Problem List   Diagnosis   • CHICO (obstructive sleep apnea)   • Coronary artery disease due to lipid rich plaque   • Mixed hyperlipidemia   • Essential hypertension   • Stage 3 chronic kidney disease (H[facility])   • Dementia ([facility])   • ICD (implantable cardioverter-defibrillator), dual, in situ   • Chest pain, atypical   • Atypical angina ([facility])   • Dyspnea on exertion   • AF (paroxysmal atrial fibrillation) ([facility])   • Pneumonia due to infectious organism       Active Med List:    Current Outpatient Medications:   •  albuterol sulfate  (90 Base) MCG/ACT inhaler, Inhale 2 puffs Every 4 (Four) Hours As Needed for Wheezing., Disp: , Rfl:   •  aspirin 81 MG EC tablet, Take 81 mg by mouth Daily., Disp: , Rfl:   •  docusate sodium (COLACE) 100 MG capsule, Take 100 mg by mouth 2 (Two) Times a Day., Disp: , Rfl:   •  donepezil (ARICEPT) 5 MG tablet, Daily., Disp: , Rfl:   •  finasteride (PROSCAR) 5 MG tablet, Take 5 mg by mouth  Daily., Disp: , Rfl:   •  furosemide (LASIX) 80 MG tablet, Take 80 mg by mouth Daily., Disp: , Rfl:   •  Multiple Vitamins-Minerals (EYE VITAMINS & MINERALS PO), Take 1 tablet by mouth Daily., Disp: , Rfl:   •  nitroglycerin (NITROSTAT) 0.4 MG SL tablet, As Needed., Disp: , Rfl:   •  Omega-3 1000 MG capsule, Take 1 tablet by mouth Daily., Disp: , Rfl:   •  omeprazole (priLOSEC) 40 MG capsule, TAKE 1 CAPSULE BY MOUTH 2 (TWO) TIMES A DAY. (Patient taking differently: Take 20 mg by mouth 2 (Two) Times a Day.), Disp: 180 capsule, Rfl: 3  •  potassium chloride (K-DUR,KLOR-CON) 10 MEQ CR tablet, TAKE 1 TABLET EVERY DAY, Disp: 90 tablet, Rfl: 1  •  pravastatin (PRAVACHOL) 40 MG tablet, Take 40 mg by mouth Daily., Disp: , Rfl:   •  rOPINIRole (REQUIP) 0.5 MG tablet, Take 0.5 mg by mouth Every Night. Take 1 hour before bedtime., Disp: , Rfl:   •  tamsulosin (FLOMAX) 0.4 MG capsule 24 hr capsule, Take 1 capsule by mouth Every Night., Disp: , Rfl:   •  vitamin B-12 (CYANOCOBALAMIN) 1000 MCG tablet, Take 1,000 mcg by mouth Daily., Disp: , Rfl:   •  azithromycin (Zithromax Z-Pantera) 250 MG tablet, Take 2 tablets by mouth on day 1, then 1 tablet daily on days 2-5, Disp: 6 tablet, Rfl: 0     Past Medical History:  Past Medical History:   Diagnosis Date   • CAD (coronary artery disease)    • Chest pain, atypical 1/11/2020   • Chronic kidney disease    • Dementia (HCC) 8/1/2019   • History of echocardiogram 01/04/2018    EF 35-40%, Mild-Mod golbal hypokinesis, Mild MR/TR, RVSP 38 mmHg, AV sclerotic and Mild calcification   • Hyperlipidemia    • Hypertension    • CHICO (obstructive sleep apnea)    • Pneumonia due to infectious organism 1/5/2022       Past Surgical History:  Past Surgical History:   Procedure Laterality Date   • CARDIAC CATHETERIZATION  05/2014    EF 50%, Mid LAD 80% stenosis, Circ 70% stenosis, % occluded   • CARDIAC CATHETERIZATION N/A 7/16/2020    Procedure: Right and Left Heart Cath;  Surgeon: Rajan Golden  "MD Alex;  Location: [facility] CATH INVASIVE LOCATION;  Service: Cardiology;  Laterality: N/A;   • CARDIAC DEFIBRILLATOR PLACEMENT  07/2014    [employer]   • CORONARY ARTERY BYPASS GRAFT         Social History:  Social History     Socioeconomic History   • Marital status:    Tobacco Use   • Smoking status: Former Smoker   • Smokeless tobacco: Never Used   Substance and Sexual Activity   • Alcohol use: No   • Drug use: No   • Sexual activity: Defer       Allergies:  Allergies   Allergen Reactions   • Morphine Hives   • Penicillins Hives       Immunizations:    There is no immunization history on file for this patient.       Objective:         Review of Systems   Unable to perform ROS: Dementia        /68   Pulse 71   Ht 160 cm (63\")   BMI 25.69 kg/m²     Constitutional:       General: Not in acute distress.     Appearance: Well-developed.   Eyes:      General: No scleral icterus.     Conjunctiva/sclera: Conjunctivae normal.      Pupils: Pupils are equal, round, and reactive to light.   HENT:      Head: Normocephalic and atraumatic.   Neck:      Thyroid: No thyromegaly.      Vascular: JVD elevated.   Pulmonary:      Comments: Bilateral rales  Cardiovascular:      Normal rate. Regular rhythm.   Abdominal:      General: Bowel sounds are normal.      Palpations: Abdomen is soft.   Musculoskeletal: Normal range of motion.      Cervical back: Normal range of motion. Skin:     General: Skin is warm and dry.   Neurological:      Mental Status: Alert and oriented to person, place, and time.         In-Office Procedure(s):  Procedures  No orders to display      ICD eval interpreted by me  MDT [id number]  Battery 2.74V    R wave 1.5mv  Threshold 2.5V  Impedance 646 ohms    HV 42/62 ohms    Events - 88 NSVT      ASCVD RIsk Score::  The ASCVD Risk score ([place] BERYL Jr., et al., 2013) failed to calculate for the following reasons:    The 2013 ASCVD risk score is only valid for ages 40 to 79    Recent " Radiology:          Lab Review:       Recent labs reviewed and interpreted for clinical significance and application          Assessment:          Diagnosis Plan   1. Coronary artery disease due to lipid rich plaque     2. Essential hypertension     3. ICD (implantable cardioverter-defibrillator), dual, in situ     4. AF (paroxysmal atrial fibrillation) (H[facility])     5. Dementia without behavioral disturbance, unspecified dementia type (HCC)            Plan:      1. Permanent AF - rates controlled, on ASA only due to fall risk     2. ICD follow up - nornal device function, chronically low R wave and high threshold    3. Acute on chronic systolic heart failure - appears volume up on exam with JVP to the angle of the jaw and bilateral rales.  Will increase Lasix to 80mg bid    4. Pneumonia - on doxycycline per NH       RTC 2 weeks       Level of Care:                 Héctor Domínguez MD  03/07/22

## 2022-03-15 ENCOUNTER — OFFICE (OUTPATIENT)
Dept: URBAN - METROPOLITAN AREA CLINIC 64 | Facility: CLINIC | Age: 87
End: 2022-03-15
Payer: MEDICAID

## 2022-03-15 VITALS — HEART RATE: 68 BPM | HEIGHT: 70 IN | SYSTOLIC BLOOD PRESSURE: 107 MMHG | DIASTOLIC BLOOD PRESSURE: 70 MMHG

## 2022-03-15 DIAGNOSIS — K62.5 HEMORRHAGE OF ANUS AND RECTUM: ICD-10-CM

## 2022-03-15 PROCEDURE — 99213 OFFICE O/P EST LOW 20 MIN: CPT | Performed by: NURSE PRACTITIONER

## 2022-03-15 RX ORDER — POLYETHYLENE GLYCOL 3350 17 G/17G
34 POWDER, FOR SOLUTION ORAL
Qty: 1020 | Refills: 11 | Status: ACTIVE
Start: 2022-03-15

## 2022-03-15 RX ORDER — MIRTAZAPINE 7.5 MG/1
7.5 TABLET ORAL
Qty: 30 | Refills: 11 | Status: ACTIVE
Start: 2022-03-15

## 2022-03-15 RX ORDER — LACTOSE-REDUCED FOOD
LIQUID (ML) ORAL
Qty: 60 | Refills: 11 | Status: ACTIVE
Start: 2022-03-15

## 2022-03-21 NOTE — PROGRESS NOTES
Subjective:     Encounter Date:03/21/2022      Patient ID: Matthew Rosenbaum is a 89 y.o. male.    Chief Complaint:  Chief Complaint   Patient presents with   • Follow-up       HPI:  Mr Rosenbaum is an 89 yo patient of Dr. Golden who resides in a NH and presents for followup of heart failure.  He has a past medical history significant for HTN, HLD, CKD-3, CAD, ischemic cardiomyopathy and dementia.  He also has permanent AF.  He had an ICD implanted in 7/14.     His daughter was visiting him at the NH about a month ago and noted that he was coughing and appeared dyspneic.  He apparently had a CXR done which showed pneumonia.  He was placed on doxycycline and has had some improvement.  The patient is not able to give any meaningful history due to his dementia.    He was seen about 2 weeks ago and his lasix was increased for 3 days.  Today, his daughter states that he is better but still has some dyspnea.            The following portions of the patient's history were reviewed and updated as appropriate: allergies, current medications, past family history, past medical history, past social history, past surgical history and problem list.    Problem List:  Patient Active Problem List   Diagnosis   • CHICO (obstructive sleep apnea)   • Coronary artery disease due to lipid rich plaque   • Mixed hyperlipidemia   • Essential hypertension   • Stage 3 chronic kidney disease (HCC)   • Dementia ([facility])   • ICD (implantable cardioverter-defibrillator), dual, in situ   • Chest pain, atypical   • Atypical angina ([facility])   • Dyspnea on exertion   • AF (paroxysmal atrial fibrillation) ([facility])   • Pneumonia due to infectious organism       Active Med List:    Current Outpatient Medications:   •  albuterol sulfate  (90 Base) MCG/ACT inhaler, Inhale 2 puffs Every 4 (Four) Hours As Needed for Wheezing., Disp: , Rfl:   •  aspirin 81 MG EC tablet, Take 81 mg by mouth Daily., Disp: , Rfl:   •  docusate sodium (COLACE) 100 MG capsule,  Take 100 mg by mouth 2 (Two) Times a Day., Disp: , Rfl:   •  donepezil (ARICEPT) 5 MG tablet, Daily., Disp: , Rfl:   •  doxycycline (VIBRAMYCIN) 100 MG capsule, , Disp: , Rfl:   •  finasteride (PROSCAR) 5 MG tablet, Take 5 mg by mouth Daily., Disp: , Rfl:   •  furosemide (LASIX) 80 MG tablet, Take 80 mg by mouth Daily., Disp: , Rfl:   •  guaiFENesin 100 MG pack, Take  by mouth., Disp: , Rfl:   •  metoprolol tartrate (LOPRESSOR) 25 MG tablet, , Disp: , Rfl:   •  Multiple Vitamins-Minerals (EYE VITAMINS & MINERALS PO), Take 1 tablet by mouth Daily., Disp: , Rfl:   •  nitroglycerin (NITROSTAT) 0.4 MG SL tablet, As Needed., Disp: , Rfl:   •  Omega-3 1000 MG capsule, Take 1 tablet by mouth Daily., Disp: , Rfl:   •  omeprazole (priLOSEC) 40 MG capsule, TAKE 1 CAPSULE BY MOUTH 2 (TWO) TIMES A DAY. (Patient taking differently: Take 20 mg by mouth 2 (Two) Times a Day.), Disp: 180 capsule, Rfl: 3  •  potassium chloride (K-DUR,KLOR-CON) 10 MEQ CR tablet, TAKE 1 TABLET EVERY DAY, Disp: 90 tablet, Rfl: 1  •  pravastatin (PRAVACHOL) 40 MG tablet, Take 40 mg by mouth Daily., Disp: , Rfl:   •  rOPINIRole (REQUIP) 0.5 MG tablet, Take 0.5 mg by mouth Every Night. Take 1 hour before bedtime., Disp: , Rfl:   •  tamsulosin (FLOMAX) 0.4 MG capsule 24 hr capsule, Take 1 capsule by mouth Every Night., Disp: , Rfl:   •  vitamin B-12 (CYANOCOBALAMIN) 1000 MCG tablet, Take 1,000 mcg by mouth Daily., Disp: , Rfl:   •  azithromycin (Zithromax Z-Pantera) 250 MG tablet, Take 2 tablets by mouth on day 1, then 1 tablet daily on days 2-5, Disp: 6 tablet, Rfl: 0     Past Medical History:  Past Medical History:   Diagnosis Date   • CAD (coronary artery disease)    • Chest pain, atypical 1/11/2020   • Chronic kidney disease    • Dementia (HCC) 8/1/2019   • History of echocardiogram 01/04/2018    EF 35-40%, Mild-Mod golbal hypokinesis, Mild MR/TR, RVSP 38 mmHg, AV sclerotic and Mild calcification   • Hyperlipidemia    • Hypertension    • CHICO (obstructive  "sleep apnea)    • Pneumonia due to infectious organism 1/5/2022       Past Surgical History:  Past Surgical History:   Procedure Laterality Date   • CARDIAC CATHETERIZATION  05/2014    EF 50%, Mid LAD 80% stenosis, Circ 70% stenosis, % occluded   • CARDIAC CATHETERIZATION N/A 7/16/2020    Procedure: Right and Left Heart Cath;  Surgeon: Rajan Golden MD;  Location: [facility] CATH INVASIVE LOCATION;  Service: Cardiology;  Laterality: N/A;   • CARDIAC DEFIBRILLATOR PLACEMENT  07/2014    [employer]   • CORONARY ARTERY BYPASS GRAFT         Social History:  Social History     Socioeconomic History   • Marital status:    Tobacco Use   • Smoking status: Former Smoker   • Smokeless tobacco: Never Used   Substance and Sexual Activity   • Alcohol use: No   • Drug use: No   • Sexual activity: Defer       Allergies:  Allergies   Allergen Reactions   • Morphine Hives   • Penicillins Hives       Immunizations:    There is no immunization history on file for this patient.       Objective:         Review of Systems   Respiratory: Positive for shortness of breath.    Cardiovascular: Negative for chest pain, palpitations and leg swelling.   All other systems reviewed and are negative.       BP 92/60   Pulse 74   Ht 160 cm (63\")   BMI 25.69 kg/m²     Constitutional:       General: Not in acute distress.     Appearance: Well-developed.   Eyes:      General: No scleral icterus.     Conjunctiva/sclera: Conjunctivae normal.      Pupils: Pupils are equal, round, and reactive to light.   HENT:      Head: Normocephalic and atraumatic.   Neck:      Thyroid: No thyromegaly.   Pulmonary:      Effort: Pulmonary effort is normal.      Breath sounds: Normal breath sounds.   Cardiovascular:      Normal rate. Irregularly irregular rhythm.   Abdominal:      General: Bowel sounds are normal.      Palpations: Abdomen is soft.   Musculoskeletal: Normal range of motion.      Cervical back: Normal range of motion. Skin:     " General: Skin is warm and dry.   Neurological:      Mental Status: Alert and oriented to person, place, and time.         In-Office Procedure(s):  Procedures  No orders to display      ICD eval interpreted by me  MDT [id number]  Battery 2.73V    R wave 1.8mv  Threshold 2.5V  Impedance 603 ohms    HV 38/58 ohms    Events - 10 NSVT    ASCVD RIsk Score::  The ASCVD Risk score (Khari CORDOBA Jr., et al., 2013) failed to calculate for the following reasons:    The 2013 ASCVD risk score is only valid for ages 40 to 79    Recent Radiology:          Lab Review:       Recent labs reviewed and interpreted for clinical significance and application          Assessment:          Diagnosis Plan   1. Coronary artery disease due to lipid rich plaque     2. Essential hypertension     3. ICD (implantable cardioverter-defibrillator), dual, in situ     4. AF (paroxysmal atrial fibrillation) (HCC)            Plan:      1. Permanent AF - rates controlled, on ASA only due to fall risk     2. ICD follow up - nornal device function, chronically low R wave and high threshold     3. Acute on chronic systolic heart failure - appears volume up on exam with JVP to the angle of the jaw and bilateral rales.  Will increase Lasix to 80mg qam and 40mg qpm     4. Pneumonia - completed doxycycline        RTC 1 month    Level of Care:                 Héctor Domínguez MD  03/21/22   Finasteride Pregnancy And Lactation Text: This medication is absolutely contraindicated during pregnancy. It is unknown if it is excreted in breast milk.

## 2022-03-23 NOTE — TELEPHONE ENCOUNTER
Catrachita pt    Patient's daughter called, states she received a call today from nursing home and was told to contact our office to schedule appointment asap to have ICD turned off. Would like to know if patient needs to come in for appointment or if device rep can go to nursing home to turn off device?      CB# [phone]  Cherie(daughter)    [facility]  Tel.# [phone]

## 2022-06-27 ENCOUNTER — TELEPHONE (OUTPATIENT)
Dept: CARDIOLOGY | Facility: CLINIC | Age: 87
End: 2022-06-27

## (undated) DEVICE — BALN PRESS WEDGE 6F 110CM

## (undated) DEVICE — PINNACLE INTRODUCER SHEATH: Brand: PINNACLE

## (undated) DEVICE — SKIN PREP TRAY W/CHG: Brand: MEDLINE INDUSTRIES, INC.

## (undated) DEVICE — ST ACC MICROPUNCTURE STFF/CANN PLAT/TP 4F 21G 40CM

## (undated) DEVICE — GW PTFE EMERALD HEPCOAT FC J TIP STD .035 3MM 150CM

## (undated) DEVICE — PK TRY HEART CATH 50

## (undated) DEVICE — CATH DIAG IMPULSE MPA 6F 100CM

## (undated) DEVICE — CATH MPAC STP 6F: Brand: SUPER TORQUE